# Patient Record
Sex: MALE | Race: BLACK OR AFRICAN AMERICAN | NOT HISPANIC OR LATINO | Employment: UNEMPLOYED | URBAN - METROPOLITAN AREA
[De-identification: names, ages, dates, MRNs, and addresses within clinical notes are randomized per-mention and may not be internally consistent; named-entity substitution may affect disease eponyms.]

---

## 2017-04-03 ENCOUNTER — TELEPHONE (OUTPATIENT)
Dept: NEUROLOGY | Facility: CLINIC | Age: 27
End: 2017-04-03

## 2017-04-18 ENCOUNTER — OFFICE VISIT (OUTPATIENT)
Dept: NEUROLOGY | Facility: CLINIC | Age: 27
End: 2017-04-18
Payer: COMMERCIAL

## 2017-04-18 VITALS
HEIGHT: 67 IN | HEART RATE: 57 BPM | WEIGHT: 206.81 LBS | DIASTOLIC BLOOD PRESSURE: 71 MMHG | BODY MASS INDEX: 32.46 KG/M2 | SYSTOLIC BLOOD PRESSURE: 113 MMHG

## 2017-04-18 DIAGNOSIS — G40.009 PARTIAL IDIOPATHIC EPILEPSY WITH SEIZURES OF LOCALIZED ONSET, NOT INTRACTABLE, WITHOUT STATUS EPILEPTICUS: ICD-10-CM

## 2017-04-18 PROBLEM — G40.409 TONIC CLONIC SEIZURES: Status: ACTIVE | Noted: 2017-04-18

## 2017-04-18 PROCEDURE — 99215 OFFICE O/P EST HI 40 MIN: CPT | Mod: S$GLB,,, | Performed by: PSYCHIATRY & NEUROLOGY

## 2017-04-18 PROCEDURE — 99999 PR PBB SHADOW E&M-EST. PATIENT-LVL III: CPT | Mod: PBBFAC,,, | Performed by: PSYCHIATRY & NEUROLOGY

## 2017-04-18 RX ORDER — LACOSAMIDE 50 MG/1
TABLET ORAL
Qty: 60 TABLET | Refills: 11 | Status: ON HOLD | OUTPATIENT
Start: 2017-04-18 | End: 2017-05-14 | Stop reason: HOSPADM

## 2017-04-18 NOTE — PROGRESS NOTES
"EPILEPSY CLINIC:   FOLLOW UP VISIT    Name: Rigo Baez  MRN:[id number]   CSN: [id number]  Date of service: 4/18/2017    Age:27 y.o.   Gender:male     The patient is here today for follow up to 10/2016 visit for epilepsy visit. Since his last visit he has had six seizures, he had one the day after starting his Vimpat, another after increasing from 50 mg qd to BID.  He has had 4 more since then that all occurred on the weekend and they all occur at night.  Patient denies ever having a seizure while awake, his SO states these seizures occur within 1-2 hours after going to sleep.  He also states that every morning he wakes up his left leg is sore, difficult to use for 2-3 min.  During seizures, they start with a screech, has tonic-clonic movements, and tongue biting w/o loss of bowel or bladder continence.  His wife states he goes back into "deep sleep" after these seizures and is drowsy for the rest of the day, but reports regaining consciousness 2-3 hours later.  He has associated diffuse myalgia & bitemporal, tension headache.    History obtained from significant other and the patient.     CHIEF COMPLAINT: Consult       SEIZURE HISTORY:  This patient has a history of the following seizure types:  tonic/clonic    There is h/o SE in the past: no      Other relevant seizure hx: As above    INTERVAL HISTORY (Since last visit):      This is a 27 y.o.  year old right handed male who presents with a chief complaint of   Chief Complaint   Patient presents with    Consult    who was last seen by me on 10/17/2016          He has been on Lacosamide 100 mg BID for the last 6 months. Reports compliance:  yes     There are no complaints of side effects related to current AED lacosamide  The side-effects are: none    The last seizure was on 4/10  .  The typical seizure frequency has been 1 seizure qmonth. The seizure frequency has been  are worsening since last visit.     Status epilepticus since last visit:  no    Any injury " sustained from seizures since last visit:  yes  If yes, details: Tongue biting w/ sunsequent difficulty w/ po intake for the following week.    Admission to hospital since last visit: no     Overall sense of well being since last visit: are worsening     Any other relevant history since last visit: None    RELEVANT LABS AND TESTS SINCE LAST VISIT:   No results found for this or any previous visit.   No results found for this or any previous visit.     Additional tests:  1)CT Scan: None   2) EEG\Video Monitoring:  None   3) PET Scan:  None  4) Neuropsychological evaluation:  None  5) DEXA Scan:  None   6) Others:  None    RISK FACTORS FOR SEIZURES:    1. Head Trauma:  No    2. CNS Infections:  No  3. CNS Tumors: No     4. CNS Vascular Disease: No     5. Febrile Seizures: No    6. Developmental Delay: No       7. Family History of Seizures: No    8. Birth history: No known complications    CURRENT MEDICATIONS:   No current outpatient prescriptions on file.     No current facility-administered medications for this visit.         CURRENT ANTI EPILEPTIC MEDICATIONS:  Anticonvulsants have been prescribed but not taken including lamotrigine (Lamictal).      PRIOR ANTICONVULSANT HISTORY:   1) Acetazolamide (Diamox, AZM): medication not used  2) Carbamazepine (Tegretol, CBZ): medication not used  3) Ethosuximide (Zarontin, ESM): medication not used  4) Gabapentin(Neurontin, GPN): medication not used  5) Eslicarbazepine:  medication not used  6) Lacosamide (Vimpat, LCS): Lacosamide used but not effective  7) Lamotrigine (Lamictal, LTG): medication not used  8) Levatiracetam (Keppra, LEV): medication not used  9) Methosuximide (Celontin, MSM): medication not used  10) Methylphenytoin (Mesantoin, MHT):medication not used  11) Oxcarbazepine (Trileptal, OXC): medication not used  12) Phenobarbital (PB): medication not used  13) Phenytoin (Dilantin, PHT):  medication not used  14) Pregabalin (Lyrica, PGB): medication not used  15)  Primidone (Mysoline, PRM): medication not used  16) Retigabine (Potega, RTG): medication not used  17) Rufinamide (Banzel, RUF): medication not used  18) Tiagabine (Gabatril, TGB): medication not used  19) Topiramate (Topamax, TPM):  medication not used  20) Vigabatrin (Sabril, VGB): medication not used  21) Valproic acid (Depakote, VPA): medication not used  22) Zonisamide (Zonegran, ZNA): medication not used    Benzodiazepines:  1) Diazepam: Rectal (Diastat): medication not used  2) Diazepam: Oral (Valium, DZ): medication not used  3) Clorazepate (Tranxene, CLZ):  medication not used  4) Clobazem (Omfi, Frisium, CLB): medication not used    Vagal Nerve Stimulator: medication not used      PAST MEDICAL HISTORY:   Active Ambulatory Problems     Diagnosis Date Noted    No Active Ambulatory Problems     Resolved Ambulatory Problems     Diagnosis Date Noted    No Resolved Ambulatory Problems     No Additional Past Medical History        PAST PSYCHIATRIC HISTORY:  Anxiety associated with sleep 2/2 to concern for seizure while sleeping    PAST SURGICAL HISTORY including Epilepsy surgery: No past surgical history on file.     FAMILY HISTORY: No family history on file.      SOCIAL HISTORY:   Social History     Social History    Marital status: Single     Spouse name: N/A    Number of children: N/A    Years of education: N/A     Occupational History    Not on file.     Social History Main Topics    Smoking status: Never Smoker    Smokeless tobacco: Not on file    Alcohol use Not on file    Drug use: Not on file    Sexual activity: Not on file     Other Topics Concern    Not on file     Social History Narrative      a) Marital status: Lives with SO of 5 years                                                    b) Living situation: patient lives with significant other  c) Employed/Unemployed/Other: Employed full time    DRIVING HISTORY:  Currently driving: Yes      LEVEL OF EDUCATION: some college - 3y of  "college    SUBSTANCE USE:No tob/EtOH/IVDU    ALLERGIES: Review of patient's allergies indicates no known allergies.     REVIEW OF SYSTEMS:  Review of Systems   Constitutional: Negative for chills and fever.   HENT: Negative for ear pain, hearing loss, mouth sores and sore throat.    Eyes: Negative for photophobia, pain and visual disturbance.   Respiratory: Negative for chest tightness and shortness of breath.    Cardiovascular: Negative for chest pain and palpitations.   Gastrointestinal: Negative for abdominal distention, abdominal pain, anal bleeding, blood in stool, constipation, diarrhea, nausea, rectal pain and vomiting.   Endocrine: Negative for polydipsia and polyuria.   Genitourinary: Negative for dysuria and hematuria.   Musculoskeletal: Negative for arthralgias and myalgias.   Skin: Negative for rash and wound.   Neurological: Positive for seizures and weakness (RLE on waking). Negative for dizziness, tremors, syncope, speech difficulty, light-headedness, numbness and headaches.        GENERAL EXAMINATION:  /71  Pulse (!) 57  Ht 5' 7" (1.702 m)  Wt 93.8 kg (206 lb 12.7 oz)  BMI 32.39 kg/m2     GENERAL EXAMINATION:  General Appearance:    This is an average built male who appears well.  HEENT: There are no dysmorphic features  Skin: There are no obvious stigmata for neurocutaneous disorders.  Neck: supple   Cardiovascular: regular rate and rhythm  Lungs: clear  Abdomen: deferred  The spine is non-tender.  Kyphosis:  NoScoliosis: No   Extremities: Warm and well perfused    NEUROLOGICAL EXAMINATION:  Mental status: Alert and oriented x 4; pleasant and cooperative with exam  Memory: Recent memory intact, Remote memory intact, Age correct, Month correct  Attention and concentration: intact  Fund of knowledge: adequate  Speech: normal  Dysarthria: No   Eyes: PERRL; EOM intact; No nystagmus.The visual pursuits  were smooth with normal saccadic eye movements. Fundoscopic Exam: normal w/o hemorrhages, " exudates, or papilledema  No facial asymmetry. Intact facial sensation bilaterally.  Hearing was intact bilaterally to finger rub  Tongue and palate was in the midline  Intact SCM and trapezii bilaterally     Motor examination:  Normal bulk and tone bilaterally. Power: no pronater drift; 5/5 bilaterally symmetric UE/LE  Abnormal movements: none  Deep tendon reflexes: 2+ bilaterally symmetric UE/LE with flexor plantars  Dysmetria: No     Sensory examination:   Normal, light touch, pin prick, and vibration bilaterally symmetric UE/LE    Gait:  Normal gait and station; able to tandem walk without any difficulty      IMPRESSION:  The patient's history is suggestive of interval history of worsening seizures.  Will need to prusue the imaging ordered during previous visit to further characterize the cause of his seizures.     Related Condition(s): None    PLAN:  Partial idiopathic epilepsy with seizures of localized onset, not intractable, without status epilepticus       - EMU admission 5/8/2017       - MRI Brain W WO contrast - seizure protocol before EMU admission       - Continue current Rx w/ Lacosamide 100 mg BID - taper off for 2 days prior to  EMU admission       - Refill for Lacosamide printed off this visit           2014 EPILEPSY QUALITY MEASUREMENT (AAN)    1a. Seizure Frequency: 1/mo  1b. Seizure Intervention:Lacosamide 100 mg BID    2.  a. Etiology: Unclear - will require further investigation  b. Seizure Type:  Pending further evaluation  c. Epilepsy Syndrome: Pending further evaluation    3.  a. Side-effects of AED: None reported   b. Intervention for side-effects: N/A    4. Screening for psychiatric or behavioral disorders: Anxiety    5. 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 Counseling for women of child-bearing potential and epilepsy: N/A    7. Referral of treatment-resistant epilepsy to  [facility] epilepsy center (q 2 years): N/A.    The patient was asked to call me/the clinic 1 week after the test(s) are done to obtain results.    The following issues were  all discussed in detail with the patient and family/caregiver(s):    1. Diagnosis, plans, prognosis, medications and possible side-effects, risks and benefits of treatment, other alternatives to AEDs.  2. Risks related to continued seizures, status epilepticus, SUDEP, driving restrictions and seizure precautions ( no baths but showers are ok, no swimming unsupervised, no use of heavy machinery, no use of sharp moving objects, avoid heights).   3. Issues related to pregnancy, OCP and breast feeding as it relates to epilepsy.  4. The potential of teratogenicity and suicidal risks of anti-epileptic medications.  5.Avoid any activity that compromise patient safety related to seizures.     Questions and concerns raised by the patient and family/care-giver(s) were addressed and they indicated understanding of everything discussed and agreed to plans as above.    5/8/2016 EMU admission    Andrew Cummins MD  PGY - 1  Pager: [phone]

## 2017-04-19 DIAGNOSIS — R56.9 SEIZURES: Primary | ICD-10-CM

## 2017-04-28 ENCOUNTER — PATIENT MESSAGE (OUTPATIENT)
Dept: NEUROLOGY | Facility: CLINIC | Age: 27
End: 2017-04-28

## 2017-05-04 ENCOUNTER — TELEPHONE (OUTPATIENT)
Dept: NEUROLOGY | Facility: CLINIC | Age: 27
End: 2017-05-04

## 2017-05-04 NOTE — TELEPHONE ENCOUNTER
Spoke to Tanya, pts partner, who stated pt cannot commit to EMU admission 5/8 due to his employment. Advised Tanya, I can also write letter advising need for admission if necessary. Tanya has my direct # and will call back when rescheduling possible.

## 2017-05-08 ENCOUNTER — HOSPITAL ENCOUNTER (INPATIENT)
Facility: HOSPITAL | Age: 27
LOS: 6 days | Discharge: HOME | DRG: 101 | End: 2017-05-14
Attending: INTERNAL MEDICINE | Admitting: INTERNAL MEDICINE
Payer: COMMERCIAL

## 2017-05-08 ENCOUNTER — HOSPITAL ENCOUNTER (OUTPATIENT)
Dept: RADIOLOGY | Facility: HOSPITAL | Age: 27
Discharge: HOME | DRG: 101 | End: 2017-05-08
Attending: PSYCHIATRY & NEUROLOGY
Payer: COMMERCIAL

## 2017-05-08 DIAGNOSIS — G40.219 LOCALIZATION-RELATED (FOCAL) (PARTIAL) SYMPTOMATIC EPILEPSY AND EPILEPTIC SYNDROMES WITH COMPLEX PARTIAL SEIZURES, INTRACTABLE, WITHOUT STATUS EPILEPTICUS: ICD-10-CM

## 2017-05-08 DIAGNOSIS — R56.9 SEIZURES: ICD-10-CM

## 2017-05-08 LAB
ALBUMIN SERPL BCP-MCNC: 3.6 G/DL
ALP SERPL-CCNC: 142 U/L
ALT SERPL W/O P-5'-P-CCNC: 22 U/L
AMPHET+METHAMPHET UR QL: NEGATIVE
ANION GAP SERPL CALC-SCNC: 9 MMOL/L
AST SERPL-CCNC: 25 U/L
BARBITURATES UR QL SCN>200 NG/ML: NEGATIVE
BASOPHILS # BLD AUTO: 0.03 K/UL
BASOPHILS NFR BLD: 0.3 %
BENZODIAZ UR QL SCN>200 NG/ML: NEGATIVE
BILIRUB SERPL-MCNC: 0.4 MG/DL
BILIRUB UR QL STRIP: NEGATIVE
BUN SERPL-MCNC: 10 MG/DL
BZE UR QL SCN: NEGATIVE
CALCIUM SERPL-MCNC: 9.1 MG/DL
CANNABINOIDS UR QL SCN: NEGATIVE
CHLORIDE SERPL-SCNC: 106 MMOL/L
CLARITY UR REFRACT.AUTO: ABNORMAL
CO2 SERPL-SCNC: 24 MMOL/L
COLOR UR AUTO: YELLOW
CREAT SERPL-MCNC: 1 MG/DL
CREAT UR-MCNC: 123 MG/DL
DIFFERENTIAL METHOD: NORMAL
EOSINOPHIL # BLD AUTO: 0.5 K/UL
EOSINOPHIL NFR BLD: 4.8 %
ERYTHROCYTE [DISTWIDTH] IN BLOOD BY AUTOMATED COUNT: 13 %
EST. GFR  (AFRICAN AMERICAN): >60 ML/MIN/1.73 M^2
EST. GFR  (NON AFRICAN AMERICAN): >60 ML/MIN/1.73 M^2
GLUCOSE SERPL-MCNC: 75 MG/DL
GLUCOSE UR QL STRIP: NEGATIVE
HCT VFR BLD AUTO: 45.1 %
HGB BLD-MCNC: 15.6 G/DL
HGB UR QL STRIP: NEGATIVE
KETONES UR QL STRIP: NEGATIVE
LEUKOCYTE ESTERASE UR QL STRIP: NEGATIVE
LYMPHOCYTES # BLD AUTO: 3.4 K/UL
LYMPHOCYTES NFR BLD: 30.5 %
MAGNESIUM SERPL-MCNC: 2.2 MG/DL
MCH RBC QN AUTO: 29.4 PG
MCHC RBC AUTO-ENTMCNC: 34.6 %
MCV RBC AUTO: 85 FL
METHADONE UR QL SCN>300 NG/ML: NEGATIVE
MONOCYTES # BLD AUTO: 1 K/UL
MONOCYTES NFR BLD: 9.2 %
NEUTROPHILS # BLD AUTO: 6.1 K/UL
NEUTROPHILS NFR BLD: 55 %
NITRITE UR QL STRIP: NEGATIVE
OPIATES UR QL SCN: NEGATIVE
PCP UR QL SCN>25 NG/ML: NEGATIVE
PH UR STRIP: 7 [PH] (ref 5–8)
PHOSPHATE SERPL-MCNC: 3 MG/DL
PLATELET # BLD AUTO: 248 K/UL
PMV BLD AUTO: 9.6 FL
POTASSIUM SERPL-SCNC: 4.1 MMOL/L
PROT SERPL-MCNC: 7.6 G/DL
PROT UR QL STRIP: NEGATIVE
RBC # BLD AUTO: 5.31 M/UL
SODIUM SERPL-SCNC: 139 MMOL/L
SP GR UR STRIP: 1.02 (ref 1–1.03)
T4 FREE SERPL-MCNC: 1.04 NG/DL
TOXICOLOGY INFORMATION: NORMAL
TSH SERPL DL<=0.005 MIU/L-ACNC: 0.18 UIU/ML
URN SPEC COLLECT METH UR: ABNORMAL
UROBILINOGEN UR STRIP-ACNC: NEGATIVE EU/DL
WBC # BLD AUTO: 11.14 K/UL

## 2017-05-08 PROCEDURE — 25000003 PHARM REV CODE 250: Performed by: PHYSICIAN ASSISTANT

## 2017-05-08 PROCEDURE — 70553 MRI BRAIN STEM W/O & W/DYE: CPT | Mod: TC

## 2017-05-08 PROCEDURE — 99222 1ST HOSP IP/OBS MODERATE 55: CPT | Mod: ,,, | Performed by: PHYSICIAN ASSISTANT

## 2017-05-08 PROCEDURE — 80307 DRUG TEST PRSMV CHEM ANLYZR: CPT

## 2017-05-08 PROCEDURE — 80053 COMPREHEN METABOLIC PANEL: CPT

## 2017-05-08 PROCEDURE — 25500020 PHARM REV CODE 255: Performed by: PSYCHIATRY & NEUROLOGY

## 2017-05-08 PROCEDURE — 83735 ASSAY OF MAGNESIUM: CPT

## 2017-05-08 PROCEDURE — 95951 PR EEG MONITORING/VIDEORECORD: CPT | Mod: 26,,, | Performed by: PSYCHIATRY & NEUROLOGY

## 2017-05-08 PROCEDURE — 82570 ASSAY OF URINE CREATININE: CPT

## 2017-05-08 PROCEDURE — 85025 COMPLETE CBC W/AUTO DIFF WBC: CPT

## 2017-05-08 PROCEDURE — 36415 COLL VENOUS BLD VENIPUNCTURE: CPT

## 2017-05-08 PROCEDURE — 84439 ASSAY OF FREE THYROXINE: CPT

## 2017-05-08 PROCEDURE — 84100 ASSAY OF PHOSPHORUS: CPT

## 2017-05-08 PROCEDURE — 93010 ELECTROCARDIOGRAM REPORT: CPT | Mod: ,,, | Performed by: INTERNAL MEDICINE

## 2017-05-08 PROCEDURE — A9585 GADOBUTROL INJECTION: HCPCS | Performed by: PSYCHIATRY & NEUROLOGY

## 2017-05-08 PROCEDURE — 70553 MRI BRAIN STEM W/O & W/DYE: CPT | Mod: 26,,, | Performed by: RADIOLOGY

## 2017-05-08 PROCEDURE — 20600001 HC STEP DOWN PRIVATE ROOM

## 2017-05-08 PROCEDURE — 93005 ELECTROCARDIOGRAM TRACING: CPT

## 2017-05-08 PROCEDURE — 84443 ASSAY THYROID STIM HORMONE: CPT

## 2017-05-08 PROCEDURE — 81003 URINALYSIS AUTO W/O SCOPE: CPT

## 2017-05-08 PROCEDURE — 95957 EEG DIGITAL ANALYSIS: CPT

## 2017-05-08 PROCEDURE — 95951 HC EEG MONITORING/VIDEO RECORD: CPT

## 2017-05-08 RX ORDER — ACETAMINOPHEN 325 MG/1
650 TABLET ORAL EVERY 4 HOURS PRN
Status: DISCONTINUED | OUTPATIENT
Start: 2017-05-08 | End: 2017-05-14 | Stop reason: HOSPADM

## 2017-05-08 RX ORDER — SODIUM CHLORIDE 0.9 % (FLUSH) 0.9 %
3 SYRINGE (ML) INJECTION EVERY 8 HOURS
Status: DISCONTINUED | OUTPATIENT
Start: 2017-05-08 | End: 2017-05-14 | Stop reason: HOSPADM

## 2017-05-08 RX ORDER — DOCUSATE SODIUM 100 MG/1
100 CAPSULE, LIQUID FILLED ORAL 2 TIMES DAILY
Status: DISCONTINUED | OUTPATIENT
Start: 2017-05-08 | End: 2017-05-14 | Stop reason: HOSPADM

## 2017-05-08 RX ORDER — ONDANSETRON 8 MG/1
8 TABLET, ORALLY DISINTEGRATING ORAL EVERY 8 HOURS PRN
Status: DISCONTINUED | OUTPATIENT
Start: 2017-05-08 | End: 2017-05-14 | Stop reason: HOSPADM

## 2017-05-08 RX ORDER — ONDANSETRON 2 MG/ML
4 INJECTION INTRAMUSCULAR; INTRAVENOUS EVERY 8 HOURS PRN
Status: DISCONTINUED | OUTPATIENT
Start: 2017-05-08 | End: 2017-05-14 | Stop reason: HOSPADM

## 2017-05-08 RX ORDER — GADOBUTROL 604.72 MG/ML
10 INJECTION INTRAVENOUS
Status: COMPLETED | OUTPATIENT
Start: 2017-05-08 | End: 2017-05-08

## 2017-05-08 RX ADMIN — GADOBUTROL 10 ML: 604.72 INJECTION INTRAVENOUS at 07:05

## 2017-05-08 RX ADMIN — SODIUM CHLORIDE, PRESERVATIVE FREE 3 ML: 5 INJECTION INTRAVENOUS at 09:05

## 2017-05-08 NOTE — ASSESSMENT & PLAN NOTE
- Continuous EEG monitoring. EMU consulted  - Keppra held on admit per EMU recs  - Seizure precautions and activations per protcol  - IV Valium PRN for GTC greater than 5 min - hospital medicine to call epilepsy on call before administering

## 2017-05-08 NOTE — CONSULTS
[facility]  Epilepsy Consult Note  Name: Rigo Baez  MRN: [id number]   CSN: [id number]      Date: 05/08/2017    SUBJECTIVE:   Patient seen in consultation by Tanya Fisher PA-C for seizures.     History of Present Illness:  The patient is a 27 y.o. yo RHAAM   The patient was accompanied by his family member who provided some of the history.      Patient reports that he started having seizures at age 22. He reports that they occur out of sleep. He states that his girlfriend has told him that she makes a screeching sound followed by tonic clonic activity, reports tongue biting. He reports that his last seizure was yesterday. He reports that frequency varies form 1 a month to 2-3 a month. He reports that he stopped taking vimpat 3 weeks ago. He is on no other AEDs. Please see below clinic note for more details.       Epilepsy History (per Dr. Tate's clinic note)  Onset of seizures was at age 22 years - all seizures to date per patient and GF are nocturnal.     1st seizure: per patient he woke up one day in the ambulance - has no recollection of the event.   GF states that she heard a screech (typical - occurs with all subsequent sz) followed by generalized tonic, clonic activity with his eyes wide open and frothing at the mouth and unresponsive. EMS was called and he was taklen to [facility] - evaluated and sent home. Not started on AED  2nd seizure occurred ~ 5-6 months later.  Subsequently occurred q 4-6 months until x 2 years - now occurring q 2-3 per month.   Patient reports that he always bites his tongue and his muscles feel sore the next day after the seizure - that is how he know he had a seizure.     Last seizure: 09/16  Triggers for seizures: sleep deprivation - possibly  The longest seizure-free interval: 6 months until 2 years ago, then 1 month only  There is no history of status epilepticus.   There is history of physical injury as a result of seizures: bruises  Postictal  symptoms include: muscle soreness and tiredness    Seizure Triggers  Sleep Deprivation - None  Other medications - None  Psych/stress - None  Photic stimulation - None  Hyperventilation - None  Medical Problems - None  Menses - No  Sensory Stimulation (light, sound, etc) - None  Missed dose of meds - None    AED Treatments  Present regimen  Vimpat     Prior treatments      Not tried  acetazolamide (Diamox, AZM)  amantadine  carbamazepine (Tegretol, CBZ)  clobezam (Onfi or Frizium, CLB)  ethosuximide (Zarontin, ESM)  eslicarbazine (Aptiom, ESL)  felbamate (felbatol, FBM)  gabapentin (Neurontin, GPN)  lamotrigine (Lamictal, LTG)   levetiracetam (Keppra, LEV)  methsuximide (Celontin, MSM)  methyphenytoin (Mesantion, MHT)  oxcarbazepine (Trileptal OXC)  perampanel (Fycompa, FCP)   phenobarbital (Pb)  phenytoin (Dilantin, PHT)  pregabalin (Lyrica, PGB)  primidone (Mysoline, PRM)  retigabine (Potiga, RTG)  rufinamide (Banzel, RUF)  tiagabine (Gabatril,  TGB)  topiramate (Topamax, TPM)  viagabatrin, (Sabril, VGB)  vagal nerve stimulator (VNS)  valproic acid (Depakote, VPA)  zonisamide (Zonegran, ZNA)  Benzodiazepines  diazepam - rectal (Diastatl)  diazepam - oral (Valium, DZ)  clonazepam (Klonopin, CZP)  clorazepate (Tranxene, CLZ)  Ativan  Brain Stimulation  Vagal Nerve Stimulation-n/a  DBS- n/a    Compliance method  Memory - yes  Mom or Spouse - Yes  Pill Box - no  Rudy calendar - no  Turn over medication bottle - no  Phone alarm - no    Seizure Evaluation  EEG Routine -   EEG Ambulatory -   EEG\Video Monitoring -   MRI/MRA -   5/8/17  9 mm somewhat rounded region of susceptibility in the right putamen concerning for possible prior hemorrhage however no volume loss or signal abnormality on additional sequences in this region. Clinical correlation and correlation with prior EEG findings advised.    Otherwise unremarkable MRI brain specifically without evidence for additional parenchymal signal abnormality or enhancing  lesion.    Incidental probable proteinaceous mucus retention cyst right maxillary antra.    CT/CTA Scan -   PET Scan -   Neuropsychological evaluation -   DEXA Scan    Potential Epilepsy Risk Factors:   Pregnancy/Labor/Delivery - full term uncomplicated pregnancy labor and vaginal delivery  Febrile seizures - none  Head injury  - none  CNS infection - none     Stroke - none  Family Hx of Sz - none    Review of Systems:  Constitutional: no fever or chills  Eyes: no visual changes  ENT: no nasal congestion or sore throat  Respiratory: no cough or shortness of breath  Cardiovascular: no chest pain or palpitations  Gastrointestinal: no nausea or vomiting, no abdominal pain or change in bowel habits  Genitourinary: no hematuria or dysuria  Hematologic/Lymphatic: no easy bruising or lymphadenopathy  Musculoskeletal: no arthralgias or myalgias  Neurological: positive for seizures, negative for dizziness, headaches, tremors and weakness  Behavioral/Psych: no auditory or visual hallucinations  Endocrine: no heat or cold intolerance    PAST MEDICAL HISTORY:   Active Ambulatory Problems     Diagnosis Date Noted    No Active Ambulatory Problems     Resolved Ambulatory Problems     Diagnosis Date Noted    No Resolved Ambulatory Problems     No Additional Past Medical History        PAST SURGICAL HISTORY: No past surgical history on file.     FAMILY HISTORY: No family history on file.      SOCIAL HISTORY:   Social History     Social History    Marital status: Single     Spouse name: N/A    Number of children: N/A    Years of education: N/A     Occupational History    Not on file.     Social History Main Topics    Smoking status: Never Smoker    Smokeless tobacco: Not on file    Alcohol use Not on file    Drug use: Not on file    Sexual activity: Not on file     Other Topics Concern    Not on file     Social History Narrative        SUBSTANCE USE:  Social History     Social History Main Topics    Smoking status: Never  Smoker    Smokeless tobacco: Not on file    Alcohol use Not on file    Drug use: Not on file    Sexual activity: Not on file      Social History   Substance Use Topics    Smoking status: Never Smoker    Smokeless tobacco: Not on file    Alcohol use Not on file        ALLERGIES: Review of patient's allergies indicates no known allergies.       OBJECTIVE:     Vital Signs (Most Recent):       Physical Exam:  General: well developed, well nourished  HENT: Head:normocephalic, atraumatic. Ears:right ear normal, left ear normal. Nose: no discharge. Throat: lips, mucosa, and tongue normal; teeth and gums normal.  Eyes: conjunctivae/corneas clear. PERRL.   Lungs:  normal respiratory effort  Cardiovascular: Heart: regular rate and rhythm. Chest Wall: no tenderness. Extremities: no cyanosis or edema, or clubbing. Pulses: not examined.    Higher Cortical Function:    Patient is a well developed, pleasant, well groomed individual appearing their stated age  Oriented - intact to person, place and time and followed two step instruction correctly.   Memory - Intact   Fund of knowledge was appropriate.    R-L Orientation - Intact   Language - Speech was fluent without evidence for an aphasia.    Cranial Nerves II - XII:    EOMs were intact with normal smooth and no nystagmus.    PERRLA.  Visual fields were full to confrontation.    Motor - facial movement was symmetrical and normal.    Facial sensory - Light touch sensations were normal.    Hearing was normal to finger rub.  Palate moved well and was symmetrical with normal palatal and oral sensation.    Tongue movement was full. Normal power and bulk was found in the massiter and rotator muscles of the neck.  Motor: Power, bulk and tone were normal in all extremities.  Sensory: Light touch and position senses were normal in all extremities.    Coordination:       Rapid alternating movements and rapid finger tapping - normal.       Finger to nose - nl.    Gait:  Station, gait  and tandem walking were done without difficulty and Romberg was negative.  Tremor: resting, postural, intentional - none    Laboratory:  CBC: No results for input(s): WBC, RBC, HGB, HCT, PLT, MCV, MCH, MCHC in the last 168 hours.  CMP: No results for input(s): GLU, CALCIUM, ALBUMIN, PROT, NA, K, CO2, CL, BUN, CREATININE, ALKPHOS, ALT, AST, BILITOT in the last 168 hours.    Diagnostic Results:  Labs: Reviewed  MRI: Reviewed    ASSESSMENT/PLAN:     IMPRESSION:  1. Seizures - focal with secondary generalization vs primary generalized epilepsy; EMU for definitive diagnosis and to assess any unidentified daytime seizures    DISPOSITION:    1. Start VEEG  2. Hold Vimpat   3. Seizure precautions  4. Activation procedures per protocol - none currently   5. IV Valium PRN for GTC greater than 5 min - hospital medicine to call epilepsy on call before administering      Discussed with Dr. Tate

## 2017-05-08 NOTE — H&P
"[facility] Hospital Medicine  History & Physical    Patient Name: Rigo Baez  MRN: [id number]  Admission Date: 5/8/2017  Attending Physician: Aracelis Jorge MD   Primary Care Provider: Primary Doctor [facility] Medicine Team: [facility] HOSP MED F Tanya Fisher PA-C     Patient information was obtained from patient and ER records.     Subjective:     Principal Problem:Localization-related (focal) (partial) symptomatic epilepsy and epileptic syndromes with complex partial seizures, intractable, without status epilepticus    Chief Complaint: No chief complaint on file.       HPI: 27 year old male with PMHx of seizure disorder presenting for EEG monitoring to characterize convulsions. Pt denies any other past medical history as well as tobacco use, alcohol abuse, and illicit drug use. Pt reports episodes began at age 22 and have always occurred during sleep. Pt does not recall the events. Pt endorses post-ictal confusion, fatigue following episodes, and tongue biting. Pt denies urinary and bowel incontinence. Pt reports he has not taken his keppra in over a month because "it didn't work." Pt reports last seizure occurred last night during sleep. Today, pt c/o myalgias and tongue soreness 2/2 event last night. Pt denies chest pain, SOB, abdominal pain, N/V/D, fever, chills, and recent sick contacts.    No past medical history on file.    No past surgical history on file.    Review of patient's allergies indicates:  No Known Allergies    No current facility-administered medications on file prior to encounter.      Current Outpatient Prescriptions on File Prior to Encounter   Medication Sig    lacosamide (VIMPAT) 50 mg Tab Take 2 tablets twice daily     Family History     None        Social History Main Topics    Smoking status: Never Smoker    Smokeless tobacco: Not on file    Alcohol use Not on file    Drug use: Not on file    Sexual activity: Not on file     Review of Systems   Constitutional: " Negative for chills, diaphoresis, fatigue and fever.   HENT: Negative for congestion, hearing loss, rhinorrhea, sinus pressure and trouble swallowing.    Eyes: Negative for photophobia and visual disturbance.   Respiratory: Negative for cough, chest tightness, shortness of breath and wheezing.    Cardiovascular: Negative for chest pain, palpitations and leg swelling.   Gastrointestinal: Negative for abdominal distention, abdominal pain, diarrhea, nausea and vomiting.   Endocrine: Negative for cold intolerance and heat intolerance.   Genitourinary: Negative for dysuria, frequency and hematuria.   Musculoskeletal: Positive for myalgias. Negative for back pain, gait problem, neck pain and neck stiffness.   Skin: Negative for rash and wound.   Allergic/Immunologic: Negative for immunocompromised state.   Neurological: Positive for seizures. Negative for dizziness, syncope, weakness, light-headedness and headaches.   Hematological: Negative for adenopathy. Does not bruise/bleed easily.   Psychiatric/Behavioral: Negative for agitation, behavioral problems, confusion and dysphoric mood. The patient is not nervous/anxious.      Objective:     Vital Signs (Most Recent):    Vital Signs (24h Range):           There is no height or weight on file to calculate BMI.    Physical Exam   Constitutional: He is oriented to person, place, and time. He appears well-developed and well-nourished. No distress.   HENT:   Head: Normocephalic and atraumatic.   Mouth/Throat: No oropharyngeal exudate.   Eyes: Conjunctivae and EOM are normal. No scleral icterus.   Neck: Normal range of motion. Neck supple. No JVD present. No tracheal deviation present.   Cardiovascular: Normal rate, regular rhythm, normal heart sounds and intact distal pulses.    No murmur heard.  Abdominal: Soft. Bowel sounds are normal. He exhibits no distension. There is no tenderness.   Musculoskeletal: Normal range of motion. He exhibits no edema.   Neurological: He is  alert and oriented to person, place, and time. No cranial nerve deficit.   Skin: Skin is warm and dry. He is not diaphoretic. No erythema.   Psychiatric: He has a normal mood and affect. His behavior is normal.        Significant Labs: All pertinent labs within the past 24 hours have been reviewed.    Significant Imaging: I have reviewed all pertinent imaging results/findings within the past 24 hours.    Assessment/Plan:     * Localization-related (focal) (partial) symptomatic epilepsy and epileptic syndromes with complex partial seizures, intractable, without status epilepticus  - Continuous EEG monitoring. EMU consulted  - Keppra held on admit per EMU recs  - Seizure precautions and activations per protcol  - IV Valium PRN for GTC greater than 5 min - hospital medicine to call epilepsy on call before administering       VTE Risk Mitigation         Ordered     Low Risk of VTE  Once      05/08/17 1051        Tanya Fisher PA-C  Department of Hospital Medicine   [facility]

## 2017-05-08 NOTE — SUBJECTIVE & OBJECTIVE
No past medical history on file.    No past surgical history on file.    Review of patient's allergies indicates:  No Known Allergies    No current facility-administered medications on file prior to encounter.      Current Outpatient Prescriptions on File Prior to Encounter   Medication Sig    lacosamide (VIMPAT) 50 mg Tab Take 2 tablets twice daily     Family History     None        Social History Main Topics    Smoking status: Never Smoker    Smokeless tobacco: Not on file    Alcohol use Not on file    Drug use: Not on file    Sexual activity: Not on file     Review of Systems   Constitutional: Negative for chills, diaphoresis, fatigue and fever.   HENT: Negative for congestion, hearing loss, rhinorrhea, sinus pressure and trouble swallowing.    Eyes: Negative for photophobia and visual disturbance.   Respiratory: Negative for cough, chest tightness, shortness of breath and wheezing.    Cardiovascular: Negative for chest pain, palpitations and leg swelling.   Gastrointestinal: Negative for abdominal distention, abdominal pain, diarrhea, nausea and vomiting.   Endocrine: Negative for cold intolerance and heat intolerance.   Genitourinary: Negative for dysuria, frequency and hematuria.   Musculoskeletal: Positive for myalgias. Negative for back pain, gait problem, neck pain and neck stiffness.   Skin: Negative for rash and wound.   Allergic/Immunologic: Negative for immunocompromised state.   Neurological: Positive for seizures. Negative for dizziness, syncope, weakness, light-headedness and headaches.   Hematological: Negative for adenopathy. Does not bruise/bleed easily.   Psychiatric/Behavioral: Negative for agitation, behavioral problems, confusion and dysphoric mood. The patient is not nervous/anxious.      Objective:     Vital Signs (Most Recent):    Vital Signs (24h Range):           There is no height or weight on file to calculate BMI.    Physical Exam   Constitutional: He is oriented to person,  place, and time. He appears well-developed and well-nourished. No distress.   HENT:   Head: Normocephalic and atraumatic.   Mouth/Throat: No oropharyngeal exudate.   Eyes: Conjunctivae and EOM are normal. No scleral icterus.   Neck: Normal range of motion. Neck supple. No JVD present. No tracheal deviation present.   Cardiovascular: Normal rate, regular rhythm, normal heart sounds and intact distal pulses.    No murmur heard.  Abdominal: Soft. Bowel sounds are normal. He exhibits no distension. There is no tenderness.   Musculoskeletal: Normal range of motion. He exhibits no edema.   Neurological: He is alert and oriented to person, place, and time. No cranial nerve deficit.   Skin: Skin is warm and dry. He is not diaphoretic. No erythema.   Psychiatric: He has a normal mood and affect. His behavior is normal.        Significant Labs: All pertinent labs within the past 24 hours have been reviewed.    Significant Imaging: I have reviewed all pertinent imaging results/findings within the past 24 hours.

## 2017-05-08 NOTE — PLAN OF CARE
"Problem: Patient Care Overview  Goal: Plan of Care Review  POC reviewed w/ pt. Telemetry on pt, nsr. eeg on pt. Aaox4. Tolerating diet well, denies n/v. No s/x of distress noted. Pt states "i had a seizure last night at home, i hope i have one here". Side rails padded, suction at bs. Will cont to monitor.       "

## 2017-05-09 PROCEDURE — 95951 HC EEG MONITORING/VIDEO RECORD: CPT

## 2017-05-09 PROCEDURE — 25000003 PHARM REV CODE 250: Performed by: PHYSICIAN ASSISTANT

## 2017-05-09 PROCEDURE — 95957 EEG DIGITAL ANALYSIS: CPT

## 2017-05-09 PROCEDURE — 99232 SBSQ HOSP IP/OBS MODERATE 35: CPT | Mod: ,,, | Performed by: PHYSICIAN ASSISTANT

## 2017-05-09 PROCEDURE — 20600001 HC STEP DOWN PRIVATE ROOM

## 2017-05-09 RX ORDER — DIPHENHYDRAMINE HCL 50 MG
50 CAPSULE ORAL ONCE
Status: COMPLETED | OUTPATIENT
Start: 2017-05-10 | End: 2017-05-10

## 2017-05-09 RX ORDER — TRAMADOL HYDROCHLORIDE 50 MG/1
100 TABLET ORAL ONCE
Status: COMPLETED | OUTPATIENT
Start: 2017-05-10 | End: 2017-05-10

## 2017-05-09 RX ADMIN — SODIUM CHLORIDE, PRESERVATIVE FREE 3 ML: 5 INJECTION INTRAVENOUS at 05:05

## 2017-05-09 RX ADMIN — SODIUM CHLORIDE, PRESERVATIVE FREE 3 ML: 5 INJECTION INTRAVENOUS at 09:05

## 2017-05-09 NOTE — ASSESSMENT & PLAN NOTE
- Continuous EEG monitoring. EMU consulted  - Keppra held on admit per EMU recs  - Seizure precautions and activations per protcol  5/9: No seizures since admit. Sleep deprivation tonight, benadryl 50 mg and tramadol 100 gm tomorrow morning per EMU.  - IV Valium PRN for GTC greater than 5 min - hospital medicine to call epilepsy on call before administering

## 2017-05-09 NOTE — PLAN OF CARE
05/09/17 1438   Discharge Assessment   Assessment Type Discharge Planning Assessment   Confirmed/corrected address and phone number on facesheet? Yes   Assessment information obtained from? Patient   Expected Length of Stay (days) 5   Communicated expected length of stay with patient/caregiver yes   Prior to hospitilization cognitive status: Alert/Oriented   Prior to hospitalization functional status: Independent   Current cognitive status: Alert/Oriented   Current Functional Status: Independent   Arrived From home or self-care   Able to Return to Prior Arrangements yes   Is patient able to care for self after discharge? Yes   Patient's perception of discharge disposition home or selfcare   Readmission Within The Last 30 Days no previous admission in last 30 days   Equipment Currently Used at Home none   Discharge Plan A Home   Discharge Plan B Home   Patient/Family In Agreement With Plan yes

## 2017-05-09 NOTE — PROCEDURES
EPILEPSY MONITORING UNIT  EEG/VIDEO TELEMETRY REPORT    Rigo Baez  [id number]  1990    DATE OF SERVICE: 5/8-9/2017    DATE OF ADMISSION: 5/8/2017  9:34 AM      METHODOLOGY      Electroencephalographic (EEG) is recorded with electrodes placed according to the International 10-20 placement system.  Thirty Two (32) channels of digital signal including the T1 and T2 electrodes are simultaneously recorded from the scalp and also including EKG, EMG  and/or eye movement monitors.  Recording band pass was 0.1 to 512 hz.  Digital video recording of the patient is simultaneously recorded with the EEG.  The patient is instructed report clinical symptoms which may occur during the recording session.  EEG and video recording is stored and archived in digital format. Activation procedures which include photic stimulation, hyperventilation and instructing patients to perform simple task are done in selected patients.         The EEG is displayed on a monitor screen and can be reformatted into different montages for evaluation.  The entire recoding is submitted for computer assisted analysis to detect spike and electrographic seizure activity.  The entire recording is visually reviewed and the times identified by computer analysis as being spikes or seizures are reviewed again.  Compresses spectral analysis (CSA) is also performed on the activity recorded from each individual channel.  This is displayed as a power display of frequencies from 0 to 30 Hz over time.   The CSA analysis is done and displayed continuously.  This is reviewed for asymmetries in power between homologous areas of the scalp and for presence of changes in power which canbe seen when seizures occur.  Sections of suspected abnormalities on the CSA is then compared with the original EEG recording.      [employer] software was also utilized in the review of this study.  This software suite analyzes the EEG recording in multiple domains.  Coherence and rhythmicity  is computed to identify EEG sections which may contain organized seizures.  Each channel undergoes analysis to detect presence of spike and sharp waves which have special and morphological characteristic of epileptic activity.  The routine EEG recording is converted from spacial into frequency domain.  This is then displayed comparing homologous areas to identify areas of significant asymmetry.  Algorithm to identify non-cortically generated artifact is used to separate eye movement, EMG and other artifact from the EEG.        ELECTROENCEPHALOGRAM  INTERICTAL:  Background activity:   The background rhythm was characterized by theta-alpha (7-9 Hz) activity with a 10 Hz posterior dominant alpha rhythm at 30-70 microvolts.   Symmetry and continuity: the background was continuous and symmetric    Sleep:   Normal sleep transients including sleep spindles, K complexes, vertex waves and POSTS were seen.    Activation procedures:   Hyperventilation and photic stimulation were not performed     Abnormal activity:   No epileptiform discharges, periodic discharges, lateralized rhythmic delta activity or electrographic seizures were seen.      ICTAL:   None    CLINICAL DESCRIPTION OF EVENTS/SEIZURES:  n/a    FINAL SUMMARY:  Recording Times  Start on 5/8/17, 12:25  Stop on 5/9/17, 07:00    A total of 18 hours and 18 minutes of EEG/Video telemetry was recorded.    ELECTROENCEPHALOGRAM   Normal awake and asleep recording.   No seizures/typical events reported during this period of recording.     CLINICAL SEIZURE/EVENT   Classification: n/a    Lateralization: n/a   Localization: n/a    Sarai Tate MD, DYLAN(), [facility].  Neurology-Epilepsy.
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
## 2017-05-09 NOTE — SUBJECTIVE & OBJECTIVE
Interval History: Pt had no acute events overnight. This AM, pt is sitting upright in bed watching TV. Pt has no complaints at this time. Pt denies seizures since admit.    Review of Systems   Constitutional: Negative for chills, diaphoresis, fatigue and fever.   Respiratory: Negative for cough, chest tightness, shortness of breath and wheezing.    Cardiovascular: Negative for chest pain, palpitations and leg swelling.   Gastrointestinal: Negative for abdominal distention, abdominal pain, diarrhea, nausea and vomiting.   Musculoskeletal: Positive for myalgias. Negative for back pain, gait problem, neck pain and neck stiffness.   Neurological: Positive for seizures. Negative for dizziness, syncope, weakness, light-headedness and headaches.     Objective:     Vital Signs (Most Recent):  Temp: 97.9 °F (36.6 °C) (05/09/17 0825)  Pulse: 62 (05/09/17 0825)  Resp: 16 (05/09/17 0825)  BP: 137/63 (05/09/17 0825)  SpO2: 97 % (05/09/17 0825) Vital Signs (24h Range):  Temp:  [97.7 °F (36.5 °C)-98.1 °F (36.7 °C)] 97.9 °F (36.6 °C)  Pulse:  [] 62  Resp:  [14-19] 16  SpO2:  [95 %-97 %] 97 %  BP: (113-137)/(56-78) 137/63     Weight: 99.8 kg (220 lb)  Body mass index is 34.46 kg/(m^2).    Intake/Output Summary (Last 24 hours) at 05/09/17 1128  Last data filed at 05/09/17 0820   Gross per 24 hour   Intake              460 ml   Output                0 ml   Net              460 ml      Physical Exam   Constitutional: He is oriented to person, place, and time. He appears well-developed and well-nourished. No distress.   Cardiovascular: Normal rate, regular rhythm, normal heart sounds and intact distal pulses.    No murmur heard.  Abdominal: Soft. Bowel sounds are normal. He exhibits no distension. There is no tenderness.   Musculoskeletal: Normal range of motion.   Neurological: He is alert and oriented to person, place, and time. No cranial nerve deficit.   Skin: Skin is warm and dry. He is not diaphoretic. No erythema.    Psychiatric: He has a normal mood and affect. His behavior is normal.       Significant Labs: All pertinent labs within the past 24 hours have been reviewed.    Significant Imaging: I have reviewed all pertinent imaging results/findings within the past 24 hours.

## 2017-05-09 NOTE — PLAN OF CARE
Problem: Patient Care Overview  Goal: Plan of Care Review  POC reviewed w/ pt and family. No seizure events today. Tolerating diet well, denies n/v. Up ad mateo. Pt to have sleep dep tonight until 0700 and to be given meds in am. Vs wnl. No s/sx of distress noted. Will cont to monitor.

## 2017-05-09 NOTE — PLAN OF CARE
Problem: Patient Care Overview  Goal: Individualization & Mutuality  Outcome: Ongoing (interventions implemented as appropriate)  Plan of care reviewed with patient. Pt remain free of falls. Pt verbalized understanding.

## 2017-05-10 PROCEDURE — 95957 EEG DIGITAL ANALYSIS: CPT

## 2017-05-10 PROCEDURE — 99232 SBSQ HOSP IP/OBS MODERATE 35: CPT | Mod: ,,, | Performed by: PHYSICIAN ASSISTANT

## 2017-05-10 PROCEDURE — 25000003 PHARM REV CODE 250: Performed by: PHYSICIAN ASSISTANT

## 2017-05-10 PROCEDURE — 95951 HC EEG MONITORING/VIDEO RECORD: CPT

## 2017-05-10 PROCEDURE — 20600001 HC STEP DOWN PRIVATE ROOM

## 2017-05-10 RX ADMIN — DIPHENHYDRAMINE HYDROCHLORIDE 50 MG: 50 CAPSULE ORAL at 06:05

## 2017-05-10 RX ADMIN — SODIUM CHLORIDE, PRESERVATIVE FREE 3 ML: 5 INJECTION INTRAVENOUS at 08:05

## 2017-05-10 RX ADMIN — TRAMADOL HYDROCHLORIDE 100 MG: 50 TABLET, FILM COATED ORAL at 06:05

## 2017-05-10 RX ADMIN — SODIUM CHLORIDE, PRESERVATIVE FREE 3 ML: 5 INJECTION INTRAVENOUS at 06:05

## 2017-05-10 RX ADMIN — SODIUM CHLORIDE, PRESERVATIVE FREE 3 ML: 5 INJECTION INTRAVENOUS at 01:05

## 2017-05-10 NOTE — PROGRESS NOTES
"[facility] Hospital Medicine  Progress Note    Patient Name: Rigo Baez  MRN: [id number]  Patient Class: IP- Inpatient   Admission Date: 5/8/2017  Length of Stay: 2 days  Attending Physician: Alfredito Swift MD  Primary Care Provider: Primary Doctor [facility] Medicine Team: [facility] F Carmelita Muñoz PA-C    Subjective:     Principal Problem:Localization-related (focal) (partial) symptomatic epilepsy and epileptic syndromes with complex partial seizures, intractable, without status epilepticus    HPI:  27 year old male with PMHx of seizure disorder presenting for EEG monitoring to characterize convulsions. Pt denies any other past medical history as well as tobacco use, alcohol abuse, and illicit drug use. Pt reports episodes began at age 22 and have always occurred during sleep. Pt does not recall the events. Pt endorses post-ictal confusion, fatigue following episodes, and tongue biting. Pt denies urinary and bowel incontinence. Pt reports he has not taken his keppra in over a month because "it didn't work." Pt reports last seizure occurred last night during sleep. Today, pt c/o myalgias and tongue soreness 2/2 event last night. Pt denies chest pain, SOB, abdominal pain, N/V/D, fever, chills, and recent sick contacts.    Hospital Course:  Pt admitted for EEG monitoring to characterize seizures  5/9: No seizures since admit. Sleep deprivation tonight, benadryl 50 mg and tramadol 100 gm tomorrow morning per EMU.  5/10: No seizure activity, no changes.      Interval History: no acute events overnight; no seizure activity overnight    Review of Systems   Constitutional: Negative for chills, diaphoresis, fatigue and fever.   Respiratory: Negative for cough, chest tightness, shortness of breath and wheezing.    Cardiovascular: Negative for chest pain, palpitations and leg swelling.   Gastrointestinal: Negative for abdominal distention, abdominal pain, diarrhea, nausea and vomiting. "   Musculoskeletal: Positive for myalgias. Negative for back pain, gait problem, neck pain and neck stiffness.   Neurological: Positive for seizures. Negative for dizziness, syncope, weakness, light-headedness and headaches.     Objective:     Vital Signs (Most Recent):  Temp: 97.8 °F (36.6 °C) (05/10/17 0807)  Pulse: (!) 54 (05/10/17 1000)  Resp: 14 (05/10/17 0807)  BP: (!) 111/55 (05/10/17 0807)  SpO2: 98 % (05/10/17 0807) Vital Signs (24h Range):  Temp:  [97.7 °F (36.5 °C)-98.9 °F (37.2 °C)] 97.8 °F (36.6 °C)  Pulse:  [51-77] 54  Resp:  [14-18] 14  SpO2:  [98 %-100 %] 98 %  BP: (111-134)/(55-72) 111/55     Weight: 99.8 kg (220 lb)  Body mass index is 34.46 kg/(m^2).    Intake/Output Summary (Last 24 hours) at 05/10/17 1150  Last data filed at 05/09/17 1800   Gross per 24 hour   Intake              360 ml   Output                0 ml   Net              360 ml      Physical Exam   Constitutional: He is oriented to person, place, and time. He appears well-developed and well-nourished. No distress.   Cardiovascular: Normal rate, regular rhythm, normal heart sounds and intact distal pulses.    No murmur heard.  Abdominal: Soft. Bowel sounds are normal. He exhibits no distension. There is no tenderness.   Musculoskeletal: Normal range of motion.   Neurological: He is alert and oriented to person, place, and time. No cranial nerve deficit.   Skin: Skin is warm and dry. He is not diaphoretic. No erythema.   Psychiatric: He has a normal mood and affect. His behavior is normal.       Significant Labs: All pertinent labs within the past 24 hours have been reviewed.    Significant Imaging: I have reviewed all pertinent imaging results/findings within the past 24 hours.    Assessment/Plan:      * Localization-related (focal) (partial) symptomatic epilepsy and epileptic syndromes with complex partial seizures, intractable, without status epilepticus  - Continuous VEEG monitoring. EMU consulted  - Vimpat held on admit per EMU  recs  - Seizure precautions and activations per protcol  5/9: No seizures since admit. Sleep deprivation tonight, benadryl 50 mg and tramadol 100 gm tomorrow morning per EMU.  5/10: no events  - IV Valium PRN for GTC greater than 5 min - hospital medicine to call epilepsy on call before administering       VTE Risk Mitigation         Ordered     Low Risk of VTE  Once      05/08/17 1051          Carmelita Muñoz PA-C  Department of Hospital Medicine   [facility]

## 2017-05-10 NOTE — ASSESSMENT & PLAN NOTE
- Continuous VEEG monitoring. EMU consulted  - Vimpat held on admit per EMU recs  - Seizure precautions and activations per protcol  5/9: No seizures since admit. Sleep deprivation tonight, benadryl 50 mg and tramadol 100 gm tomorrow morning per EMU.  5/10: no events  - IV Valium PRN for GTC greater than 5 min - hospital medicine to call epilepsy on call before administering

## 2017-05-10 NOTE — CONSULTS
[facility]  Epilepsy Consult Note  Name: Rigo Baez  MRN: [id number]   CSN: [id number]      Date: 05/10/2017     Patient seen in consultation by Tanya Fisher PA-C for seizures.   SUBJECTIVE:   Interval History:  No acute events overnight. Patient completed sleep deprivation overnight.     History of Present Illness:  The patient is a 27 y.o. yo RHAAM   The patient was accompanied by his family member who provided some of the history.      Patient reports that he started having seizures at age 22. He reports that they occur out of sleep. He states that his girlfriend has told him that she makes a screeching sound followed by tonic clonic activity, reports tongue biting. He reports that his last seizure was yesterday. He reports that frequency varies form 1 a month to 2-3 a month. He reports that he stopped taking vimpat 3 weeks ago. He is on no other AEDs. Please see below clinic note for more details.       Epilepsy History (per Dr. Tate's clinic note)  Onset of seizures was at age 22 years - all seizures to date per patient and GF are nocturnal.     1st seizure: per patient he woke up one day in the ambulance - has no recollection of the event.   GF states that she heard a screech (typical - occurs with all subsequent sz) followed by generalized tonic, clonic activity with his eyes wide open and frothing at the mouth and unresponsive. EMS was called and he was taklen to [facility] - evaluated and sent home. Not started on AED  2nd seizure occurred ~ 5-6 months later.  Subsequently occurred q 4-6 months until x 2 years - now occurring q 2-3 per month.   Patient reports that he always bites his tongue and his muscles feel sore the next day after the seizure - that is how he know he had a seizure.     Last seizure: 09/16  Triggers for seizures: sleep deprivation - possibly  The longest seizure-free interval: 6 months until 2 years ago, then 1 month only  There is no history of status  epilepticus.   There is history of physical injury as a result of seizures: bruises  Postictal symptoms include: muscle soreness and tiredness    Seizure Triggers  Sleep Deprivation - None  Other medications - None  Psych/stress - None  Photic stimulation - None  Hyperventilation - None  Medical Problems - None  Menses - No  Sensory Stimulation (light, sound, etc) - None  Missed dose of meds - None    AED Treatments  Present regimen  Vimpat     Prior treatments      Not tried  acetazolamide (Diamox, AZM)  amantadine  carbamazepine (Tegretol, CBZ)  clobezam (Onfi or Frizium, CLB)  ethosuximide (Zarontin, ESM)  eslicarbazine (Aptiom, ESL)  felbamate (felbatol, FBM)  gabapentin (Neurontin, GPN)  lamotrigine (Lamictal, LTG)   levetiracetam (Keppra, LEV)  methsuximide (Celontin, MSM)  methyphenytoin (Mesantion, MHT)  oxcarbazepine (Trileptal OXC)  perampanel (Fycompa, FCP)   phenobarbital (Pb)  phenytoin (Dilantin, PHT)  pregabalin (Lyrica, PGB)  primidone (Mysoline, PRM)  retigabine (Potiga, RTG)  rufinamide (Banzel, RUF)  tiagabine (Gabatril,  TGB)  topiramate (Topamax, TPM)  viagabatrin, (Sabril, VGB)  vagal nerve stimulator (VNS)  valproic acid (Depakote, VPA)  zonisamide (Zonegran, ZNA)  Benzodiazepines  diazepam - rectal (Diastatl)  diazepam - oral (Valium, DZ)  clonazepam (Klonopin, CZP)  clorazepate (Tranxene, CLZ)  Ativan  Brain Stimulation  Vagal Nerve Stimulation-n/a  DBS- n/a    Compliance method  Memory - yes  Mom or Spouse - Yes  Pill Box - no  Rudy calendar - no  Turn over medication bottle - no  Phone alarm - no    Seizure Evaluation  EEG Routine -   EEG Ambulatory -   EEG\Video Monitoring -   MRI/MRA -   5/8/17  9 mm somewhat rounded region of susceptibility in the right putamen concerning for possible prior hemorrhage however no volume loss or signal abnormality on additional sequences in this region. Clinical correlation and correlation with prior EEG findings advised.    Otherwise unremarkable MRI  brain specifically without evidence for additional parenchymal signal abnormality or enhancing lesion.    Incidental probable proteinaceous mucus retention cyst right maxillary antra.    CT/CTA Scan -   PET Scan -   Neuropsychological evaluation -   DEXA Scan    Potential Epilepsy Risk Factors:   Pregnancy/Labor/Delivery - full term uncomplicated pregnancy labor and vaginal delivery  Febrile seizures - none  Head injury  - none  CNS infection - none     Stroke - none  Family Hx of Sz - none    Review of Systems:  Constitutional: no fever or chills  Eyes: no visual changes  ENT: no nasal congestion or sore throat  Respiratory: no cough or shortness of breath  Cardiovascular: no chest pain or palpitations  Neurological: negative for dizziness, headaches, seizures, tremors and weakness    PAST MEDICAL HISTORY:   Active Ambulatory Problems     Diagnosis Date Noted    No Active Ambulatory Problems     Resolved Ambulatory Problems     Diagnosis Date Noted    No Resolved Ambulatory Problems     No Additional Past Medical History        PAST SURGICAL HISTORY: History reviewed. No pertinent surgical history.     FAMILY HISTORY: History reviewed. No pertinent family history.      SOCIAL HISTORY:   Social History     Social History    Marital status: Single     Spouse name: N/A    Number of children: N/A    Years of education: N/A     Occupational History    Not on file.     Social History Main Topics    Smoking status: Never Smoker    Smokeless tobacco: Not on file    Alcohol use Not on file    Drug use: Not on file    Sexual activity: Not on file     Other Topics Concern    Not on file     Social History Narrative        SUBSTANCE USE:  Social History     Social History Main Topics    Smoking status: Never Smoker    Smokeless tobacco: Not on file    Alcohol use Not on file    Drug use: Not on file    Sexual activity: Not on file      Social History   Substance Use Topics    Smoking status: Never Smoker     Smokeless tobacco: Not on file    Alcohol use Not on file        ALLERGIES: Review of patient's allergies indicates no known allergies.       OBJECTIVE:     Vital Signs (Most Recent):  Temp: 97.8 °F (36.6 °C) (05/10/17 0807)  Pulse: (!) 52 (05/10/17 0807)  Resp: 14 (05/10/17 0807)  BP: (!) 111/55 (05/10/17 0807)  SpO2: 98 % (05/10/17 0807)    Physical Exam:  General: well developed, well nourished  HENT: Head:normocephalic, atraumatic. Ears:right ear normal, left ear normal. Nose: no discharge. Throat: lips, mucosa, and tongue normal; teeth and gums normal.  Eyes: conjunctivae/corneas clear. PERRL.   Lungs:  normal respiratory effort  Cardiovascular: Heart: regular rate and rhythm. Chest Wall: no tenderness. Extremities: no cyanosis or edema, or clubbing. Pulses: not examined.    Higher Cortical Function:    Patient is a well developed, pleasant, well groomed individual appearing their stated age  Oriented - intact to person, place and time and followed two step instruction correctly.   Memory - Intact   Fund of knowledge was appropriate.    R-L Orientation - Intact   Language - Speech was fluent without evidence for an aphasia.    Cranial Nerves II - XII:    EOMs were intact with normal smooth and no nystagmus.    PERRLA.  Visual fields were full to confrontation.    Motor - facial movement was symmetrical and normal.    Facial sensory - Light touch sensations were normal.    Hearing was normal to finger rub.  Palate moved well and was symmetrical with normal palatal and oral sensation.    Tongue movement was full. Normal power and bulk was found in the massiter and rotator muscles of the neck.  Motor: Power, bulk and tone were normal in all extremities.  Sensory: Light touch and position senses were normal in all extremities.    Coordination:       Rapid alternating movements and rapid finger tapping - normal.       Finger to nose - nl.    Gait:  Station, gait and tandem walking were done without difficulty and  Romberg was negative.  Tremor: resting, postural, intentional - none    Laboratory:  CBC:     Recent Labs  Lab 05/08/17  1133   WBC 11.14   RBC 5.31   HGB 15.6   HCT 45.1      MCV 85   MCH 29.4   MCHC 34.6     CMP:     Recent Labs  Lab 05/08/17  1133   GLU 75   CALCIUM 9.1   ALBUMIN 3.6   PROT 7.6      K 4.1   CO2 24      BUN 10   CREATININE 1.0   ALKPHOS 142*   ALT 22   AST 25   BILITOT 0.4       Diagnostic Results:  Labs: Reviewed  MRI: Reviewed    ASSESSMENT/PLAN:     IMPRESSION:  1. Seizures - focal with secondary generalization vs primary generalized epilepsy; EMU for definitive diagnosis and to assess any unidentified daytime seizures    vEEG (Reviewed with Dr. Tate):  5/8-5/9: no seizures, no discharges   5/9-5/10: no seizures, no discharges    DISPOSITION:    1. [place] VEEG  2. Hold Vimpat   3. Seizure precautions  4. Activation procedures per protocol - HV and PS today    5. IV Valium PRN for GTC greater than 5 min - hospital medicine to call epilepsy on call before administering      Discussed with Dr. Tate

## 2017-05-10 NOTE — PLAN OF CARE
Problem: Patient Care Overview  Goal: Plan of Care Review  Outcome: Ongoing (interventions implemented as appropriate)  POC reviewed with pt. Pt verbalized understanding. Pt remained free from falls, skin breakdown, and injury. Call light remained within reach and side rails up x2. Neuro checks and vital signs done every 4 hours. Refer to flowsheets for full assessment and VS info. Seizure precautions maintained. EEG monitoring in place. Pt with no seizure activity so far this shift. Will continue to monitor.

## 2017-05-10 NOTE — SUBJECTIVE & OBJECTIVE
Interval History: no acute events overnight; no seizure activity overnight    Review of Systems   Constitutional: Negative for chills, diaphoresis, fatigue and fever.   Respiratory: Negative for cough, chest tightness, shortness of breath and wheezing.    Cardiovascular: Negative for chest pain, palpitations and leg swelling.   Gastrointestinal: Negative for abdominal distention, abdominal pain, diarrhea, nausea and vomiting.   Musculoskeletal: Positive for myalgias. Negative for back pain, gait problem, neck pain and neck stiffness.   Neurological: Positive for seizures. Negative for dizziness, syncope, weakness, light-headedness and headaches.     Objective:     Vital Signs (Most Recent):  Temp: 97.8 °F (36.6 °C) (05/10/17 0807)  Pulse: (!) 54 (05/10/17 1000)  Resp: 14 (05/10/17 0807)  BP: (!) 111/55 (05/10/17 0807)  SpO2: 98 % (05/10/17 0807) Vital Signs (24h Range):  Temp:  [97.7 °F (36.5 °C)-98.9 °F (37.2 °C)] 97.8 °F (36.6 °C)  Pulse:  [51-77] 54  Resp:  [14-18] 14  SpO2:  [98 %-100 %] 98 %  BP: (111-134)/(55-72) 111/55     Weight: 99.8 kg (220 lb)  Body mass index is 34.46 kg/(m^2).    Intake/Output Summary (Last 24 hours) at 05/10/17 1150  Last data filed at 05/09/17 1800   Gross per 24 hour   Intake              360 ml   Output                0 ml   Net              360 ml      Physical Exam   Constitutional: He is oriented to person, place, and time. He appears well-developed and well-nourished. No distress.   Cardiovascular: Normal rate, regular rhythm, normal heart sounds and intact distal pulses.    No murmur heard.  Abdominal: Soft. Bowel sounds are normal. He exhibits no distension. There is no tenderness.   Musculoskeletal: Normal range of motion.   Neurological: He is alert and oriented to person, place, and time. No cranial nerve deficit.   Skin: Skin is warm and dry. He is not diaphoretic. No erythema.   Psychiatric: He has a normal mood and affect. His behavior is normal.       Significant Labs:  All pertinent labs within the past 24 hours have been reviewed.    Significant Imaging: I have reviewed all pertinent imaging results/findings within the past 24 hours.

## 2017-05-11 PROCEDURE — 20600001 HC STEP DOWN PRIVATE ROOM

## 2017-05-11 PROCEDURE — 95957 EEG DIGITAL ANALYSIS: CPT

## 2017-05-11 PROCEDURE — 99232 SBSQ HOSP IP/OBS MODERATE 35: CPT | Mod: ,,, | Performed by: PHYSICIAN ASSISTANT

## 2017-05-11 PROCEDURE — 25000003 PHARM REV CODE 250: Performed by: PHYSICIAN ASSISTANT

## 2017-05-11 PROCEDURE — 95951 HC EEG MONITORING/VIDEO RECORD: CPT

## 2017-05-11 RX ORDER — DIPHENHYDRAMINE HCL 50 MG
50 CAPSULE ORAL ONCE
Status: COMPLETED | OUTPATIENT
Start: 2017-05-12 | End: 2017-05-12

## 2017-05-11 RX ORDER — TRAMADOL HYDROCHLORIDE 50 MG/1
100 TABLET ORAL ONCE
Status: COMPLETED | OUTPATIENT
Start: 2017-05-12 | End: 2017-05-12

## 2017-05-11 RX ADMIN — SODIUM CHLORIDE, PRESERVATIVE FREE 3 ML: 5 INJECTION INTRAVENOUS at 09:05

## 2017-05-11 RX ADMIN — SODIUM CHLORIDE, PRESERVATIVE FREE 3 ML: 5 INJECTION INTRAVENOUS at 02:05

## 2017-05-11 RX ADMIN — SODIUM CHLORIDE, PRESERVATIVE FREE 3 ML: 5 INJECTION INTRAVENOUS at 05:05

## 2017-05-11 NOTE — CONSULTS
[facility]  Epilepsy Consult Note  Name: Rigo Baez  MRN: [id number]   CSN: [id number]      Date: 05/11/2017     Patient seen in consultation by Tanya Fisher PA-C for seizures.   SUBJECTIVE:   Interval History:  No acute events overnight.    History of Present Illness:  The patient is a 27 y.o. yo RHAAM   The patient was accompanied by his family member who provided some of the history.      Patient reports that he started having seizures at age 22. He reports that they occur out of sleep. He states that his girlfriend has told him that she makes a screeching sound followed by tonic clonic activity, reports tongue biting. He reports that his last seizure was yesterday. He reports that frequency varies form 1 a month to 2-3 a month. He reports that he stopped taking vimpat 3 weeks ago. He is on no other AEDs. Please see below clinic note for more details.       Epilepsy History (per Dr. Tate's clinic note)  Onset of seizures was at age 22 years - all seizures to date per patient and GF are nocturnal.     1st seizure: per patient he woke up one day in the ambulance - has no recollection of the event.   GF states that she heard a screech (typical - occurs with all subsequent sz) followed by generalized tonic, clonic activity with his eyes wide open and frothing at the mouth and unresponsive. EMS was called and he was taklen to [facility] - evaluated and sent home. Not started on AED  2nd seizure occurred ~ 5-6 months later.  Subsequently occurred q 4-6 months until x 2 years - now occurring q 2-3 per month.   Patient reports that he always bites his tongue and his muscles feel sore the next day after the seizure - that is how he know he had a seizure.     Last seizure: 09/16  Triggers for seizures: sleep deprivation - possibly  The longest seizure-free interval: 6 months until 2 years ago, then 1 month only  There is no history of status epilepticus.   There is history of physical injury as  a result of seizures: bruises  Postictal symptoms include: muscle soreness and tiredness    Seizure Triggers  Sleep Deprivation - None  Other medications - None  Psych/stress - None  Photic stimulation - None  Hyperventilation - None  Medical Problems - None  Menses - No  Sensory Stimulation (light, sound, etc) - None  Missed dose of meds - None    AED Treatments  Present regimen  Vimpat     Prior treatments      Not tried  acetazolamide (Diamox, AZM)  amantadine  carbamazepine (Tegretol, CBZ)  clobezam (Onfi or Frizium, CLB)  ethosuximide (Zarontin, ESM)  eslicarbazine (Aptiom, ESL)  felbamate (felbatol, FBM)  gabapentin (Neurontin, GPN)  lamotrigine (Lamictal, LTG)   levetiracetam (Keppra, LEV)  methsuximide (Celontin, MSM)  methyphenytoin (Mesantion, MHT)  oxcarbazepine (Trileptal OXC)  perampanel (Fycompa, FCP)   phenobarbital (Pb)  phenytoin (Dilantin, PHT)  pregabalin (Lyrica, PGB)  primidone (Mysoline, PRM)  retigabine (Potiga, RTG)  rufinamide (Banzel, RUF)  tiagabine (Gabatril,  TGB)  topiramate (Topamax, TPM)  viagabatrin, (Sabril, VGB)  vagal nerve stimulator (VNS)  valproic acid (Depakote, VPA)  zonisamide (Zonegran, ZNA)  Benzodiazepines  diazepam - rectal (Diastatl)  diazepam - oral (Valium, DZ)  clonazepam (Klonopin, CZP)  clorazepate (Tranxene, CLZ)  Ativan  Brain Stimulation  Vagal Nerve Stimulation-n/a  DBS- n/a    Compliance method  Memory - yes  Mom or Spouse - Yes  Pill Box - no  Rudy calendar - no  Turn over medication bottle - no  Phone alarm - no    Seizure Evaluation  EEG Routine -   EEG Ambulatory -   EEG\Video Monitoring -   MRI/MRA -   5/8/17  9 mm somewhat rounded region of susceptibility in the right putamen concerning for possible prior hemorrhage however no volume loss or signal abnormality on additional sequences in this region. Clinical correlation and correlation with prior EEG findings advised.    Otherwise unremarkable MRI brain specifically without evidence for additional  parenchymal signal abnormality or enhancing lesion.    Incidental probable proteinaceous mucus retention cyst right maxillary antra.    CT/CTA Scan -   PET Scan -   Neuropsychological evaluation -   DEXA Scan    Potential Epilepsy Risk Factors:   Pregnancy/Labor/Delivery - full term uncomplicated pregnancy labor and vaginal delivery  Febrile seizures - none  Head injury  - none  CNS infection - none     Stroke - none  Family Hx of Sz - none    Review of Systems:  Constitutional: no fever or chills  Eyes: no visual changes  ENT: no nasal congestion or sore throat  Respiratory: no cough or shortness of breath  Cardiovascular: no chest pain or palpitations  Neurological: negative for dizziness, headaches, seizures, tremors and weakness    PAST MEDICAL HISTORY:   Active Ambulatory Problems     Diagnosis Date Noted    No Active Ambulatory Problems     Resolved Ambulatory Problems     Diagnosis Date Noted    No Resolved Ambulatory Problems     No Additional Past Medical History        PAST SURGICAL HISTORY: History reviewed. No pertinent surgical history.     FAMILY HISTORY: History reviewed. No pertinent family history.      SOCIAL HISTORY:   Social History     Social History    Marital status: Single     Spouse name: N/A    Number of children: N/A    Years of education: N/A     Occupational History    Not on file.     Social History Main Topics    Smoking status: Never Smoker    Smokeless tobacco: Not on file    Alcohol use Not on file    Drug use: Not on file    Sexual activity: Not on file     Other Topics Concern    Not on file     Social History Narrative        SUBSTANCE USE:  Social History     Social History Main Topics    Smoking status: Never Smoker    Smokeless tobacco: Not on file    Alcohol use Not on file    Drug use: Not on file    Sexual activity: Not on file      Social History   Substance Use Topics    Smoking status: Never Smoker    Smokeless tobacco: Not on file    Alcohol use Not  on file        ALLERGIES: Review of patient's allergies indicates no known allergies.       OBJECTIVE:     Vital Signs (Most Recent):  Temp: 98.5 °F (36.9 °C) (05/11/17 0807)  Pulse: (!) 59 (05/11/17 0807)  Resp: 16 (05/11/17 0807)  BP: (!) 94/46 (05/11/17 0807)  SpO2: 97 % (05/11/17 0807)    Physical Exam:  General: well developed, well nourished  HENT: Head:normocephalic, atraumatic. Ears:right ear normal, left ear normal. Nose: no discharge. Throat: lips, mucosa, and tongue normal; teeth and gums normal.  Eyes: conjunctivae/corneas clear. PERRL.   Lungs:  normal respiratory effort  Cardiovascular: Heart: regular rate and rhythm. Chest Wall: no tenderness. Extremities: no cyanosis or edema, or clubbing. Pulses: not examined.    Higher Cortical Function:    Patient is a well developed, pleasant, well groomed individual appearing their stated age  Oriented - intact to person, place and time and followed two step instruction correctly.   Memory - Intact   Fund of knowledge was appropriate.    R-L Orientation - Intact   Language - Speech was fluent without evidence for an aphasia.    Cranial Nerves II - XII:    EOMs were intact with normal smooth and no nystagmus.    PERRLA.  Visual fields were full to confrontation.    Motor - facial movement was symmetrical and normal.    Facial sensory - Light touch sensations were normal.    Hearing was normal to finger rub.  Palate moved well and was symmetrical with normal palatal and oral sensation.    Tongue movement was full. Normal power and bulk was found in the massiter and rotator muscles of the neck.  Motor: Power, bulk and tone were normal in all extremities.  Sensory: Light touch and position senses were normal in all extremities.    Coordination:       Rapid alternating movements and rapid finger tapping - normal.       Finger to nose - nl.    Gait:  Station, gait and tandem walking were done without difficulty and Romberg was negative.  Tremor: resting, postural,  intentional - none    Laboratory:  CBC:     Recent Labs  Lab 05/08/17  1133   WBC 11.14   RBC 5.31   HGB 15.6   HCT 45.1      MCV 85   MCH 29.4   MCHC 34.6     CMP:     Recent Labs  Lab 05/08/17  1133   GLU 75   CALCIUM 9.1   ALBUMIN 3.6   PROT 7.6      K 4.1   CO2 24      BUN 10   CREATININE 1.0   ALKPHOS 142*   ALT 22   AST 25   BILITOT 0.4       Diagnostic Results:  Labs: Reviewed  MRI: Reviewed    ASSESSMENT/PLAN:     IMPRESSION:  1. Seizures - focal with secondary generalization vs primary generalized epilepsy; EMU for definitive diagnosis and to assess any unidentified daytime seizures    vEEG (Reviewed with Dr. Tate):  5/8-5/9: no seizures, no discharges   5/9-5/10: no seizures, no discharges  5/10-5/11: no seizures, no discharges    DISPOSITION:    1. Cotninue VEEG  2. Hold Vimpat   3. Seizure precautions  4. Activation procedures per protocol - sleep deprivation again tonight, benadryl 50 mg and tramadol 100 mg tomorrow morning    5. IV Valium PRN for GTC greater than 5 min - hospital medicine to call epilepsy on call before administering      Discussed with Dr. Tate

## 2017-05-11 NOTE — SUBJECTIVE & OBJECTIVE
Interval History: no acute events overnight, no seizure activity thus far    Review of Systems   Constitutional: Negative for chills, diaphoresis, fatigue and fever.   Respiratory: Negative for cough, chest tightness, shortness of breath and wheezing.    Cardiovascular: Negative for chest pain, palpitations and leg swelling.   Gastrointestinal: Negative for abdominal distention, abdominal pain, diarrhea, nausea and vomiting.   Musculoskeletal: Positive for myalgias. Negative for back pain, gait problem, neck pain and neck stiffness.   Neurological: Positive for seizures. Negative for dizziness, syncope, weakness, light-headedness and headaches.     Objective:     Vital Signs (Most Recent):  Temp: 98.5 °F (36.9 °C) (05/11/17 0807)  Pulse: 60 (05/11/17 1000)  Resp: 16 (05/11/17 0807)  BP: (!) 94/46 (05/11/17 0807)  SpO2: 97 % (05/11/17 0807) Vital Signs (24h Range):  Temp:  [97.1 °F (36.2 °C)-98.7 °F (37.1 °C)] 98.5 °F (36.9 °C)  Pulse:  [52-77] 60  Resp:  [16-20] 16  SpO2:  [97 %-100 %] 97 %  BP: ()/(46-66) 94/46     Weight: 99.8 kg (220 lb)  Body mass index is 34.46 kg/(m^2).    Intake/Output Summary (Last 24 hours) at 05/11/17 1131  Last data filed at 05/10/17 1650   Gross per 24 hour   Intake              600 ml   Output                0 ml   Net              600 ml      Physical Exam   Constitutional: He is oriented to person, place, and time. He appears well-developed and well-nourished. No distress.   Cardiovascular: Normal rate, regular rhythm, normal heart sounds and intact distal pulses.    No murmur heard.  Abdominal: Soft. Bowel sounds are normal. He exhibits no distension. There is no tenderness.   Musculoskeletal: Normal range of motion.   Neurological: He is alert and oriented to person, place, and time. No cranial nerve deficit.   Skin: Skin is warm and dry. He is not diaphoretic. No erythema.   Psychiatric: He has a normal mood and affect. His behavior is normal.       Significant Labs: All  pertinent labs within the past 24 hours have been reviewed.    Significant Imaging: I have reviewed all pertinent imaging results/findings within the past 24 hours.

## 2017-05-11 NOTE — PROGRESS NOTES
"[facility] Hospital Medicine  Progress Note    Patient Name: Rigo Baez  MRN: [id number]  Patient Class: IP- Inpatient   Admission Date: 5/8/2017  Length of Stay: 3 days  Attending Physician: Alferdito Swift MD  Primary Care Provider: Primary [facility] Medicine Team: [facility] Carmelita Muñoz PA-C    Subjective:     Principal Problem:Localization-related (focal) (partial) symptomatic epilepsy and epileptic syndromes with complex partial seizures, intractable, without status epilepticus    HPI:  27 year old male with PMHx of seizure disorder presenting for EEG monitoring to characterize convulsions. Pt denies any other past medical history as well as tobacco use, alcohol abuse, and illicit drug use. Pt reports episodes began at age 22 and have always occurred during sleep. Pt does not recall the events. Pt endorses post-ictal confusion, fatigue following episodes, and tongue biting. Pt denies urinary and bowel incontinence. Pt reports he has not taken his keppra in over a month because "it didn't work." Pt reports last seizure occurred last night during sleep. Today, pt c/o myalgias and tongue soreness 2/2 event last night. Pt denies chest pain, SOB, abdominal pain, N/V/D, fever, chills, and recent sick contacts.    Hospital Course:  Pt admitted for EEG monitoring to characterize seizures  5/9: No seizures since admit. Sleep deprivation tonight, benadryl 50 mg and tramadol 100 gm tomorrow morning per EMU.  5/10-5/11: No seizure activity, no changes.      Interval History: no acute events overnight, no seizure activity thus far    Review of Systems   Constitutional: Negative for chills, diaphoresis, fatigue and fever.   Respiratory: Negative for cough, chest tightness, shortness of breath and wheezing.    Cardiovascular: Negative for chest pain, palpitations and leg swelling.   Gastrointestinal: Negative for abdominal distention, abdominal pain, diarrhea, nausea and vomiting. "   Musculoskeletal: Positive for myalgias. Negative for back pain, gait problem, neck pain and neck stiffness.   Neurological: Positive for seizures. Negative for dizziness, syncope, weakness, light-headedness and headaches.     Objective:     Vital Signs (Most Recent):  Temp: 98.5 °F (36.9 °C) (05/11/17 0807)  Pulse: 60 (05/11/17 1000)  Resp: 16 (05/11/17 0807)  BP: (!) 94/46 (05/11/17 0807)  SpO2: 97 % (05/11/17 0807) Vital Signs (24h Range):  Temp:  [97.1 °F (36.2 °C)-98.7 °F (37.1 °C)] 98.5 °F (36.9 °C)  Pulse:  [52-77] 60  Resp:  [16-20] 16  SpO2:  [97 %-100 %] 97 %  BP: ()/(46-66) 94/46     Weight: 99.8 kg (220 lb)  Body mass index is 34.46 kg/(m^2).    Intake/Output Summary (Last 24 hours) at 05/11/17 1131  Last data filed at 05/10/17 1650   Gross per 24 hour   Intake              600 ml   Output                0 ml   Net              600 ml      Physical Exam   Constitutional: He is oriented to person, place, and time. He appears well-developed and well-nourished. No distress.   Cardiovascular: Normal rate, regular rhythm, normal heart sounds and intact distal pulses.    No murmur heard.  Abdominal: Soft. Bowel sounds are normal. He exhibits no distension. There is no tenderness.   Musculoskeletal: Normal range of motion.   Neurological: He is alert and oriented to person, place, and time. No cranial nerve deficit.   Skin: Skin is warm and dry. He is not diaphoretic. No erythema.   Psychiatric: He has a normal mood and affect. His behavior is normal.       Significant Labs: All pertinent labs within the past 24 hours have been reviewed.    Significant Imaging: I have reviewed all pertinent imaging results/findings within the past 24 hours.    Assessment/Plan:      * Localization-related (focal) (partial) symptomatic epilepsy and epileptic syndromes with complex partial seizures, intractable, without status epilepticus  - Continuous VEEG monitoring. EMU consulted  - Vimpat held on admit per EMU recs  -  Seizure precautions and activations per protcol  5/9: No seizures since admit. Sleep deprivation tonight, benadryl 50 mg and tramadol 100 gm tomorrow morning per EMU.  5/10-5/11: no events  - IV Valium PRN for GTC greater than 5 min - hospital medicine to call epilepsy on call before administering       VTE Risk Mitigation         Ordered     Low Risk of VTE  Once      05/08/17 1051          Carmelita Muñoz PA-C  Department of Hospital Medicine   [facility]

## 2017-05-11 NOTE — ASSESSMENT & PLAN NOTE
- Continuous VEEG monitoring. EMU consulted  - Vimpat held on admit per EMU recs  - Seizure precautions and activations per protcol  5/9: No seizures since admit. Sleep deprivation tonight, benadryl 50 mg and tramadol 100 gm tomorrow morning per EMU.  5/10-5/11: no events  - IV Valium PRN for GTC greater than 5 min - hospital medicine to call epilepsy on call before administering

## 2017-05-11 NOTE — PLAN OF CARE
Problem: Patient Care Overview  Goal: Plan of Care Review  Outcome: Ongoing (interventions implemented as appropriate)  POC reviewed with pt. Pt verbalized understanding. Pt remained free from falls, skin breakdown, and injury. Call light remained within reach and side rail sup x2. Neuro checks and vital signs done every 4 hours. Refer to flowsheets for full assessment and VS info. Seizure precautions maintained. Pt with no events this shift. Will continue to monitor.

## 2017-05-12 PROCEDURE — 20600001 HC STEP DOWN PRIVATE ROOM

## 2017-05-12 PROCEDURE — 99232 SBSQ HOSP IP/OBS MODERATE 35: CPT | Mod: ,,, | Performed by: PHYSICIAN ASSISTANT

## 2017-05-12 PROCEDURE — 25000003 PHARM REV CODE 250: Performed by: PHYSICIAN ASSISTANT

## 2017-05-12 PROCEDURE — 95951 PR EEG MONITORING/VIDEORECORD: CPT | Mod: 26,,, | Performed by: PSYCHIATRY & NEUROLOGY

## 2017-05-12 RX ORDER — TRAMADOL HYDROCHLORIDE 50 MG/1
100 TABLET ORAL ONCE
Status: COMPLETED | OUTPATIENT
Start: 2017-05-13 | End: 2017-05-13

## 2017-05-12 RX ORDER — DIPHENHYDRAMINE HCL 50 MG
50 CAPSULE ORAL ONCE
Status: COMPLETED | OUTPATIENT
Start: 2017-05-13 | End: 2017-05-13

## 2017-05-12 RX ADMIN — TRAMADOL HYDROCHLORIDE 100 MG: 50 TABLET, COATED ORAL at 06:05

## 2017-05-12 RX ADMIN — SODIUM CHLORIDE, PRESERVATIVE FREE 3 ML: 5 INJECTION INTRAVENOUS at 06:05

## 2017-05-12 RX ADMIN — SODIUM CHLORIDE, PRESERVATIVE FREE 3 ML: 5 INJECTION INTRAVENOUS at 09:05

## 2017-05-12 RX ADMIN — DIPHENHYDRAMINE HYDROCHLORIDE 50 MG: 50 CAPSULE ORAL at 06:05

## 2017-05-12 RX ADMIN — SODIUM CHLORIDE, PRESERVATIVE FREE 3 ML: 5 INJECTION INTRAVENOUS at 03:05

## 2017-05-12 NOTE — ASSESSMENT & PLAN NOTE
- Continuous VEEG monitoring. EMU consulted  - Vimpat held on admit per EMU recs  - Seizure precautions and activations per protcol  5/9: No seizures since admit. Sleep deprivation tonight, benadryl 50 mg and tramadol 100 gm tomorrow morning per EMU.  5/10-5/12: no events  - IV Valium PRN for GTC greater than 5 min - hospital medicine to call epilepsy on call before administering
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
## 2017-05-12 NOTE — PROGRESS NOTES
"[facility] Hospital Medicine  Progress Note    Patient Name: Rigo Baez  MRN: [id number]  Patient Class: IP- Inpatient   Admission Date: 5/8/2017  Length of Stay: 4 days  Attending Physician: Alfredito Swift MD  Primary Care Provider: [facility] Doctor [facility] Medicine Team: [facility] [place] [facility] MED F Carmelita Muñoz PA-C    Subjective:     Principal Problem:Localization-related (focal) (partial) symptomatic epilepsy and epileptic syndromes with complex partial seizures, intractable, without status epilepticus    HPI:  27 year old male with PMHx of seizure disorder presenting for EEG monitoring to characterize convulsions. Pt denies any other past medical history as well as tobacco use, alcohol abuse, and illicit drug use. Pt reports episodes began at age 22 and have always occurred during sleep. Pt does not recall the events. Pt endorses post-ictal confusion, fatigue following episodes, and tongue biting. Pt denies urinary and bowel incontinence. Pt reports he has not taken his keppra in over a month because "it didn't work." Pt reports last seizure occurred last night during sleep. Today, pt c/o myalgias and tongue soreness 2/2 event last night. Pt denies chest pain, SOB, abdominal pain, N/V/D, fever, chills, and recent sick contacts.    Hospital Course:  Pt admitted for EEG monitoring to characterize seizures  5/9: No seizures since admit. Sleep deprivation tonight, benadryl 50 mg and tramadol 100 gm tomorrow morning per EMU.  5/10-5/12: No seizure activity, no changes.      Interval History: no seizure activity thus far    Review of Systems   Constitutional: Negative for chills, diaphoresis, fatigue and fever.   Respiratory: Negative for cough, chest tightness, shortness of breath and wheezing.    Cardiovascular: Negative for chest pain, palpitations and leg swelling.   Gastrointestinal: Negative for abdominal distention, abdominal pain, diarrhea, nausea and vomiting.   Musculoskeletal: Positive for " myalgias. Negative for back pain, gait problem, neck pain and neck stiffness.   Neurological: Positive for seizures. Negative for dizziness, syncope, weakness, light-headedness and headaches.     Objective:     Vital Signs (Most Recent):  Temp: 98.3 °F (36.8 °C) (05/12/17 0400)  Pulse: 70 (05/12/17 1100)  Resp: 20 (05/12/17 0400)  BP: 129/69 (05/12/17 0400)  SpO2: 98 % (05/12/17 0400) Vital Signs (24h Range):  Temp:  [97.4 °F (36.3 °C)-98.4 °F (36.9 °C)] 98.3 °F (36.8 °C)  Pulse:  [61-92] 70  Resp:  [20] 20  SpO2:  [98 %-99 %] 98 %  BP: (128-137)/(58-73) 129/69     Weight: 99.8 kg (220 lb)  Body mass index is 34.46 kg/(m^2).    Intake/Output Summary (Last 24 hours) at 05/12/17 1235  Last data filed at 05/11/17 1700   Gross per 24 hour   Intake              700 ml   Output                0 ml   Net              700 ml      Physical Exam   Constitutional: He is oriented to person, place, and time. He appears well-developed and well-nourished. No distress.   Cardiovascular: Normal rate, regular rhythm, normal heart sounds and intact distal pulses.    No murmur heard.  Abdominal: Soft. Bowel sounds are normal. He exhibits no distension. There is no tenderness.   Musculoskeletal: Normal range of motion.   Neurological: He is alert and oriented to person, place, and time. No cranial nerve deficit.   Skin: Skin is warm and dry. He is not diaphoretic. No erythema.   Psychiatric: He has a normal mood and affect. His behavior is normal.       Significant Labs: All pertinent labs within the past 24 hours have been reviewed.    Significant Imaging: I have reviewed all pertinent imaging results/findings within the past 24 hours.    Assessment/Plan:      * Localization-related (focal) (partial) symptomatic epilepsy and epileptic syndromes with complex partial seizures, intractable, without status epilepticus  - Continuous VEEG monitoring. EMU consulted  - Vimpat held on admit per EMU recs  - Seizure precautions and activations  per protcol  5/9: No seizures since admit. Sleep deprivation tonight, benadryl 50 mg and tramadol 100 gm tomorrow morning per EMU.  5/10-5/12: no events  - IV Valium PRN for GTC greater than 5 min - hospital medicine to call epilepsy on call before administering       VTE Risk Mitigation         Ordered     Low Risk of VTE  Once      05/08/17 1051          Carmelita Muñoz PA-C  Department of Hospital Medicine   [facility]

## 2017-05-12 NOTE — SUBJECTIVE & OBJECTIVE
Interval History: no seizure activity thus far    Review of Systems   Constitutional: Negative for chills, diaphoresis, fatigue and fever.   Respiratory: Negative for cough, chest tightness, shortness of breath and wheezing.    Cardiovascular: Negative for chest pain, palpitations and leg swelling.   Gastrointestinal: Negative for abdominal distention, abdominal pain, diarrhea, nausea and vomiting.   Musculoskeletal: Positive for myalgias. Negative for back pain, gait problem, neck pain and neck stiffness.   Neurological: Positive for seizures. Negative for dizziness, syncope, weakness, light-headedness and headaches.     Objective:     Vital Signs (Most Recent):  Temp: 98.3 °F (36.8 °C) (05/12/17 0400)  Pulse: 70 (05/12/17 1100)  Resp: 20 (05/12/17 0400)  BP: 129/69 (05/12/17 0400)  SpO2: 98 % (05/12/17 0400) Vital Signs (24h Range):  Temp:  [97.4 °F (36.3 °C)-98.4 °F (36.9 °C)] 98.3 °F (36.8 °C)  Pulse:  [61-92] 70  Resp:  [20] 20  SpO2:  [98 %-99 %] 98 %  BP: (128-137)/(58-73) 129/69     Weight: 99.8 kg (220 lb)  Body mass index is 34.46 kg/(m^2).    Intake/Output Summary (Last 24 hours) at 05/12/17 1235  Last data filed at 05/11/17 1700   Gross per 24 hour   Intake              700 ml   Output                0 ml   Net              700 ml      Physical Exam   Constitutional: He is oriented to person, place, and time. He appears well-developed and well-nourished. No distress.   Cardiovascular: Normal rate, regular rhythm, normal heart sounds and intact distal pulses.    No murmur heard.  Abdominal: Soft. Bowel sounds are normal. He exhibits no distension. There is no tenderness.   Musculoskeletal: Normal range of motion.   Neurological: He is alert and oriented to person, place, and time. No cranial nerve deficit.   Skin: Skin is warm and dry. He is not diaphoretic. No erythema.   Psychiatric: He has a normal mood and affect. His behavior is normal.       Significant Labs: All pertinent labs within the past 24  hours have been reviewed.    Significant Imaging: I have reviewed all pertinent imaging results/findings within the past 24 hours.

## 2017-05-12 NOTE — PLAN OF CARE
Problem: Patient Care Overview  Goal: Plan of Care Review  Outcome: Ongoing (interventions implemented as appropriate)  POC reviewed with pt and family; all able to verbalize acceptance and understanding.  Pt's VS stable.  AAOx4.  Questions answered/encouraged; reassurance provided.  Seizure precautions maintained:  Padded side rails up x2, O2 and suction at bedside.  Call light in reach, side rails up x2, nonskid socks on, bed alarm set, bed in lowest position with wheels locked.  Remains free from falls, skin breakdown, and injury.  Denies pain.  No acute events overnight.  Able to ambulate independently in room.  Sleep deprived until 0700; pt been awake all throughout shift; compliant with sleep deprivation.  Will continue to monitor.

## 2017-05-13 PROCEDURE — 20600001 HC STEP DOWN PRIVATE ROOM

## 2017-05-13 PROCEDURE — 99232 SBSQ HOSP IP/OBS MODERATE 35: CPT | Mod: ,,, | Performed by: PHYSICIAN ASSISTANT

## 2017-05-13 PROCEDURE — 95957 EEG DIGITAL ANALYSIS: CPT

## 2017-05-13 PROCEDURE — 95951 HC EEG MONITORING/VIDEO RECORD: CPT

## 2017-05-13 PROCEDURE — 99233 SBSQ HOSP IP/OBS HIGH 50: CPT | Mod: ,,, | Performed by: PSYCHIATRY & NEUROLOGY

## 2017-05-13 PROCEDURE — 95951 PR EEG MONITORING/VIDEORECORD: CPT | Mod: 26,,, | Performed by: PSYCHIATRY & NEUROLOGY

## 2017-05-13 PROCEDURE — 25000003 PHARM REV CODE 250: Performed by: PHYSICIAN ASSISTANT

## 2017-05-13 RX ADMIN — TRAMADOL HYDROCHLORIDE 100 MG: 50 TABLET, COATED ORAL at 06:05

## 2017-05-13 RX ADMIN — SODIUM CHLORIDE, PRESERVATIVE FREE 3 ML: 5 INJECTION INTRAVENOUS at 06:05

## 2017-05-13 RX ADMIN — SODIUM CHLORIDE, PRESERVATIVE FREE 3 ML: 5 INJECTION INTRAVENOUS at 03:05

## 2017-05-13 RX ADMIN — DIPHENHYDRAMINE HYDROCHLORIDE 50 MG: 50 CAPSULE ORAL at 06:05

## 2017-05-13 NOTE — SUBJECTIVE & OBJECTIVE
Interval History: no acute events overnight; no seizure activity thus far    Review of Systems   Constitutional: Negative for chills, diaphoresis, fatigue and fever.   Respiratory: Negative for cough, chest tightness, shortness of breath and wheezing.    Cardiovascular: Negative for chest pain, palpitations and leg swelling.   Gastrointestinal: Negative for abdominal distention, abdominal pain, diarrhea, nausea and vomiting.   Musculoskeletal: Positive for myalgias. Negative for back pain, gait problem, neck pain and neck stiffness.   Neurological: Positive for seizures. Negative for dizziness, syncope, weakness, light-headedness and headaches.     Objective:     Vital Signs (Most Recent):  Temp: 97.4 °F (36.3 °C) (05/13/17 0815)  Pulse: 65 (05/13/17 1100)  Resp: 14 (05/13/17 0815)  BP: (!) 102/57 (05/13/17 0815)  SpO2: 95 % (05/13/17 0815) Vital Signs (24h Range):  Temp:  [97.4 °F (36.3 °C)-98.5 °F (36.9 °C)] 97.4 °F (36.3 °C)  Pulse:  [55-71] 65  Resp:  [14-18] 14  SpO2:  [95 %-99 %] 95 %  BP: (102-130)/(51-70) 102/57     Weight: 99.8 kg (220 lb)  Body mass index is 34.46 kg/(m^2).  No intake or output data in the 24 hours ending 05/13/17 1228   Physical Exam   Constitutional: He is oriented to person, place, and time. He appears well-developed and well-nourished. No distress.   Cardiovascular: Normal rate, regular rhythm, normal heart sounds and intact distal pulses.    No murmur heard.  Abdominal: Soft. Bowel sounds are normal. He exhibits no distension. There is no tenderness.   Musculoskeletal: Normal range of motion.   Neurological: He is alert and oriented to person, place, and time. No cranial nerve deficit.   Skin: Skin is warm and dry. He is not diaphoretic. No erythema.   Psychiatric: He has a normal mood and affect. His behavior is normal.       Significant Labs: All pertinent labs within the past 24 hours have been reviewed.    Significant Imaging: I have reviewed all pertinent imaging results/findings  within the past 24 hours.

## 2017-05-13 NOTE — PLAN OF CARE
Problem: Patient Care Overview  Goal: Plan of Care Review  Plan of care reviewed with patient.  Verbalized understanding.  Patient is alert and oriented time 4.  No event of seizure noticed.  VSS stable resting comfortably in bed.  Will continue to monitor.

## 2017-05-13 NOTE — PLAN OF CARE
Problem: Patient Care Overview  Goal: Plan of Care Review  Outcome: Ongoing (interventions implemented as appropriate)  POC reviewed with pt; able to verbalize acceptance and understanding.  VS stable.  AAOx4.  Questions answered/encouraged; reassurance provided.  Call light in reach, side rails up x2, nonskid socks on, bed alarm set, bed in lowest position with wheels locked.  Seizure precautions maintained:  Padded side rails up x2, O2 and suction at bedside.  Remains free from falls, skin breakdown, and injury.  No acute events overnight.  Denies pain.  Sleep deprived until 0700 again.  Pt compliant with sleep deprivation but is extremely tired tonight.  Will continue to monitor.

## 2017-05-13 NOTE — PROGRESS NOTES
"[facility] Hospital Medicine  Progress Note    Patient Name: Rigo Baez  MRN: [id number]  Patient Class: IP- Inpatient   Admission Date: 5/8/2017  Length of Stay: 5 days  Attending Physician: Alfredito Swift MD  Primary Care Provider: [facility] Medicine Team: [facility] MED F Carmelita Muñoz PA-C    Subjective:     Principal Problem:Localization-related (focal) (partial) symptomatic epilepsy and epileptic syndromes with complex partial seizures, intractable, without status epilepticus    HPI:  27 year old male with PMHx of seizure disorder presenting for EEG monitoring to characterize convulsions. Pt denies any other past medical history as well as tobacco use, alcohol abuse, and illicit drug use. Pt reports episodes began at age 22 and have always occurred during sleep. Pt does not recall the events. Pt endorses post-ictal confusion, fatigue following episodes, and tongue biting. Pt denies urinary and bowel incontinence. Pt reports he has not taken his keppra in over a month because "it didn't work." Pt reports last seizure occurred last night during sleep. Today, pt c/o myalgias and tongue soreness 2/2 event last night. Pt denies chest pain, SOB, abdominal pain, N/V/D, fever, chills, and recent sick contacts.    Hospital Course:  Pt admitted for EEG monitoring to characterize seizures  5/9: No seizures since admit. Sleep deprivation tonight, benadryl 50 mg and tramadol 100 gm tomorrow morning per EMU.  5/10-5/13: No seizure activity, no changes.      Interval History: no acute events overnight; no seizure activity thus far    Review of Systems   Constitutional: Negative for chills, diaphoresis, fatigue and fever.   Respiratory: Negative for cough, chest tightness, shortness of breath and wheezing.    Cardiovascular: Negative for chest pain, palpitations and leg swelling.   Gastrointestinal: Negative for abdominal distention, abdominal pain, diarrhea, nausea and vomiting. "   Musculoskeletal: Positive for myalgias. Negative for back pain, gait problem, neck pain and neck stiffness.   Neurological: Positive for seizures. Negative for dizziness, syncope, weakness, light-headedness and headaches.     Objective:     Vital Signs (Most Recent):  Temp: 97.4 °F (36.3 °C) (05/13/17 0815)  Pulse: 65 (05/13/17 1100)  Resp: 14 (05/13/17 0815)  BP: (!) 102/57 (05/13/17 0815)  SpO2: 95 % (05/13/17 0815) Vital Signs (24h Range):  Temp:  [97.4 °F (36.3 °C)-98.5 °F (36.9 °C)] 97.4 °F (36.3 °C)  Pulse:  [55-71] 65  Resp:  [14-18] 14  SpO2:  [95 %-99 %] 95 %  BP: (102-130)/(51-70) 102/57     Weight: 99.8 kg (220 lb)  Body mass index is 34.46 kg/(m^2).  No intake or output data in the 24 hours ending 05/13/17 1228   Physical Exam   Constitutional: He is oriented to person, place, and time. He appears well-developed and well-nourished. No distress.   Cardiovascular: Normal rate, regular rhythm, normal heart sounds and intact distal pulses.    No murmur heard.  Abdominal: Soft. Bowel sounds are normal. He exhibits no distension. There is no tenderness.   Musculoskeletal: Normal range of motion.   Neurological: He is alert and oriented to person, place, and time. No cranial nerve deficit.   Skin: Skin is warm and dry. He is not diaphoretic. No erythema.   Psychiatric: He has a normal mood and affect. His behavior is normal.       Significant Labs: All pertinent labs within the past 24 hours have been reviewed.    Significant Imaging: I have reviewed all pertinent imaging results/findings within the past 24 hours.    Assessment/Plan:      * Localization-related (focal) (partial) symptomatic epilepsy and epileptic syndromes with complex partial seizures, intractable, without status epilepticus  - Continuous VEEG monitoring. EMU consulted  - Vimpat held on admit per EMU recs  - Seizure precautions and activations per protcol  5/9: No seizures since admit. Sleep deprivation tonight, benadryl 50 mg and tramadol  100 gm tomorrow morning per EMU.  5/10-5/12: no events  - IV Valium PRN for GTC greater than 5 min - hospital medicine to call epilepsy on call before administering       VTE Risk Mitigation         Ordered     Low Risk of VTE  Once      05/08/17 1051          Carmelita Muñoz PA-C  Department of Hospital Medicine   [facility]

## 2017-05-13 NOTE — CONSULTS
[facility]  Epilepsy Consult Note  Name: Rigo Baez  MRN: [id number]   CSN: [id number]      Date: 05/13/2017       SUBJECTIVE:   Interval History:  No acute events overnight.    History of Present Illness:  The patient is a 27 y.o. yo RHAAM   The patient was accompanied by his family member who provided some of the history.      Patient reports that he started having seizures at age 22. He reports that they occur out of sleep. He states that his girlfriend has told him that she makes a screeching sound followed by tonic clonic activity, reports tongue biting. He reports that his last seizure was yesterday. He reports that frequency varies form 1 a month to 2-3 a month. He reports that he stopped taking vimpat 3 weeks ago. He is on no other AEDs. Please see below clinic note for more details.       Epilepsy History (per Dr. Tate's clinic note)  Onset of seizures was at age 22 years - all seizures to date per patient and GF are nocturnal.     1st seizure: per patient he woke up one day in the ambulance - has no recollection of the event.   GF states that she heard a screech (typical - occurs with all subsequent sz) followed by generalized tonic, clonic activity with his eyes wide open and frothing at the mouth and unresponsive. EMS was called and he was taklen to [facility] - evaluated and sent home. Not started on AED  2nd seizure occurred ~ 5-6 months later.  Subsequently occurred q 4-6 months until x 2 years - now occurring q 2-3 per month.   Patient reports that he always bites his tongue and his muscles feel sore the next day after the seizure - that is how he know he had a seizure.     Last seizure: 09/16  Triggers for seizures: sleep deprivation - possibly  The longest seizure-free interval: 6 months until 2 years ago, then 1 month only  There is no history of status epilepticus.   There is history of physical injury as a result of seizures: bruises  Postictal symptoms include: muscle  soreness and tiredness    Seizure Triggers  Sleep Deprivation - None  Other medications - None  Psych/stress - None  Photic stimulation - None  Hyperventilation - None  Medical Problems - None  Menses - No  Sensory Stimulation (light, sound, etc) - None  Missed dose of meds - None    AED Treatments  Present regimen  Vimpat     Prior treatments      Not tried  acetazolamide (Diamox, AZM)  amantadine  carbamazepine (Tegretol, CBZ)  clobezam (Onfi or Frizium, CLB)  ethosuximide (Zarontin, ESM)  eslicarbazine (Aptiom, ESL)  felbamate (felbatol, FBM)  gabapentin (Neurontin, GPN)  lamotrigine (Lamictal, LTG)   levetiracetam (Keppra, LEV)  methsuximide (Celontin, MSM)  methyphenytoin (Mesantion, MHT)  oxcarbazepine (Trileptal OXC)  perampanel (Fycompa, FCP)   phenobarbital (Pb)  phenytoin (Dilantin, PHT)  pregabalin (Lyrica, PGB)  primidone (Mysoline, PRM)  retigabine (Potiga, RTG)  rufinamide (Banzel, RUF)  tiagabine (Gabatril,  TGB)  topiramate (Topamax, TPM)  viagabatrin, (Sabril, VGB)  vagal nerve stimulator (VNS)  valproic acid (Depakote, VPA)  zonisamide (Zonegran, ZNA)  Benzodiazepines  diazepam - rectal (Diastatl)  diazepam - oral (Valium, DZ)  clonazepam (Klonopin, CZP)  clorazepate (Tranxene, CLZ)  Ativan  Brain Stimulation  Vagal Nerve Stimulation-n/a  DBS- n/a    Compliance method  Memory - yes  Mom or Spouse - Yes  Pill Box - no  Rudy calendar - no  Turn over medication bottle - no  Phone alarm - no    Seizure Evaluation  EEG Routine -   EEG Ambulatory -   EEG\Video Monitoring -   MRI/MRA -   5/8/17  9 mm somewhat rounded region of susceptibility in the right putamen concerning for possible prior hemorrhage however no volume loss or signal abnormality on additional sequences in this region. Clinical correlation and correlation with prior EEG findings advised.    Otherwise unremarkable MRI brain specifically without evidence for additional parenchymal signal abnormality or enhancing lesion.    Incidental  probable proteinaceous mucus retention cyst right maxillary antra.    CT/CTA Scan -   PET Scan -   Neuropsychological evaluation -   DEXA Scan    Potential Epilepsy Risk Factors:   Pregnancy/Labor/Delivery - full term uncomplicated pregnancy labor and vaginal delivery  Febrile seizures - none  Head injury  - none  CNS infection - none     Stroke - none  Family Hx of Sz - none    Review of Systems:  Constitutional: no fever or chills  Eyes: no visual changes  ENT: no nasal congestion or sore throat  Respiratory: no cough or shortness of breath  Cardiovascular: no chest pain or palpitations  Neurological: negative for dizziness, headaches, seizures, tremors and weakness    PAST MEDICAL HISTORY:   Active Ambulatory Problems     Diagnosis Date Noted    No Active Ambulatory Problems     Resolved Ambulatory Problems     Diagnosis Date Noted    No Resolved Ambulatory Problems     No Additional Past Medical History        PAST SURGICAL HISTORY: History reviewed. No pertinent surgical history.     FAMILY HISTORY: History reviewed. No pertinent family history.      SOCIAL HISTORY:   Social History     Social History    Marital status: Single     Spouse name: N/A    Number of children: N/A    Years of education: N/A     Occupational History    Not on file.     Social History Main Topics    Smoking status: Never Smoker    Smokeless tobacco: Not on file    Alcohol use Not on file    Drug use: Not on file    Sexual activity: Not on file     Other Topics Concern    Not on file     Social History Narrative        SUBSTANCE USE:  Social History     Social History Main Topics    Smoking status: Never Smoker    Smokeless tobacco: Not on file    Alcohol use Not on file    Drug use: Not on file    Sexual activity: Not on file      Social History   Substance Use Topics    Smoking status: Never Smoker    Smokeless tobacco: Not on file    Alcohol use Not on file        ALLERGIES: Review of patient's allergies indicates  no known allergies.       OBJECTIVE:     Vital Signs (Most Recent):  Temp: 97.4 °F (36.3 °C) (05/13/17 0815)  Pulse: 65 (05/13/17 1100)  Resp: 14 (05/13/17 0815)  BP: (!) 102/57 (05/13/17 0815)  SpO2: 95 % (05/13/17 0815)    Physical Exam:  General: well developed, well nourished  HENT: Head:normocephalic, atraumatic. Ears:right ear normal, left ear normal. Nose: no discharge. Throat: lips, mucosa, and tongue normal; teeth and gums normal.  Eyes: conjunctivae/corneas clear. PERRL.   Lungs:  normal respiratory effort  Cardiovascular: Heart: regular rate and rhythm. Chest Wall: no tenderness. Extremities: no cyanosis or edema, or clubbing. Pulses: not examined.    Higher Cortical Function:    Patient is a well developed, pleasant, well groomed individual appearing their stated age  Oriented - intact to person, place and time and followed two step instruction correctly.   Memory - Intact   Fund of knowledge was appropriate.    R-L Orientation - Intact   Language - Speech was fluent without evidence for an aphasia.    Cranial Nerves II - XII:    EOMs were intact with normal smooth and no nystagmus.    PERRLA.  Visual fields were full to confrontation.    Motor - facial movement was symmetrical and normal.    Facial sensory - Light touch sensations were normal.    Hearing was normal to finger rub.  Palate moved well and was symmetrical with normal palatal and oral sensation.    Tongue movement was full. Normal power and bulk was found in the massiter and rotator muscles of the neck.  Motor: Power, bulk and tone were normal in all extremities.  Sensory: Light touch and position senses were normal in all extremities.    Coordination:       Rapid alternating movements and rapid finger tapping - normal.       Finger to nose - nl.    Gait:  Station, gait and tandem walking were done without difficulty and Romberg was negative.  Tremor: resting, postural, intentional - none    Laboratory:  CBC:     Recent Labs  Lab  05/08/17  1133   WBC 11.14   RBC 5.31   HGB 15.6   HCT 45.1      MCV 85   MCH 29.4   MCHC 34.6     CMP:     Recent Labs  Lab 05/08/17  1133   GLU 75   CALCIUM 9.1   ALBUMIN 3.6   PROT 7.6      K 4.1   CO2 24      BUN 10   CREATININE 1.0   ALKPHOS 142*   ALT 22   AST 25   BILITOT 0.4       Diagnostic Results:  Labs: Reviewed  MRI: Reviewed    ASSESSMENT/PLAN:     IMPRESSION:  1. Seizures - focal with secondary generalization vs primary generalized epilepsy; EMU for definitive diagnosis and to assess any unidentified daytime seizures    vEEG  5/8-5/9: no seizures, no discharges   5/9-5/10: no seizures, no discharges  5/10-5/11: no seizures, no discharges  5/11-5/12: no seizures, no discharges  5/12-5/13: no seizures, no epileptiform discharges,     DISPOSITION:    1. Cotn[place]ue VEEG  2. Hold Vimpat   3. Seizure precautions  4. Activation procedures per protocol   5. IV Valium PRN for GTC greater than 5 min - hospital medicine to call epilepsy on call before administering      Osito Parks MD (Z[place])  PGY5, [employer] clinical neurophysiology

## 2017-05-14 VITALS
HEIGHT: 67 IN | DIASTOLIC BLOOD PRESSURE: 76 MMHG | WEIGHT: 220 LBS | HEART RATE: 68 BPM | TEMPERATURE: 99 F | BODY MASS INDEX: 34.53 KG/M2 | RESPIRATION RATE: 18 BRPM | OXYGEN SATURATION: 98 % | SYSTOLIC BLOOD PRESSURE: 137 MMHG

## 2017-05-14 PROCEDURE — 99239 HOSP IP/OBS DSCHRG MGMT >30: CPT | Mod: ,,, | Performed by: PHYSICIAN ASSISTANT

## 2017-05-14 PROCEDURE — 95813 EEG EXTND MNTR 61-119 MIN: CPT | Mod: 26,,, | Performed by: PSYCHIATRY & NEUROLOGY

## 2017-05-14 PROCEDURE — 99233 SBSQ HOSP IP/OBS HIGH 50: CPT | Mod: ,,, | Performed by: PSYCHIATRY & NEUROLOGY

## 2017-05-14 RX ORDER — ZONISAMIDE 100 MG/1
300 CAPSULE ORAL NIGHTLY
Qty: 90 CAPSULE | Refills: 11
Start: 2017-05-14 | End: 2018-05-14

## 2017-05-14 NOTE — DISCHARGE INSTRUCTIONS
Patient is ready for discharge. Patient stable alert and oriented. IVs removed. No complaints of pain. Discussed discharge plan. Reviewed medications and side effects, appointments, and answered questions with patient. 1 prescription and work note given to patient.

## 2017-05-14 NOTE — CONSULTS
[facility]  Epilepsy Consult Note  Name: Rigo Baez  MRN: [id number]   CSN: [id number]      Date: 05/14/2017       SUBJECTIVE:   Interval History:  No acute events overnight.  Pt wants to be d/c home 2/2 psoriasis exacerbation on skin of [place] area    History of Present Illness:  The patient is a 27 y.o. yo RHAAM   The patient was accompanied by his family member who provided some of the history.      Patient reports that he started having seizures at age 22. He reports that they occur out of sleep. He states that his girlfriend has told him that she makes a screeching sound followed by tonic clonic activity, reports tongue biting. He reports that his last seizure was yesterday. He reports that frequency varies form 1 a month to 2-3 a month. He reports that he stopped taking vimpat 3 weeks ago. He is on no other AEDs. Please see below clinic note for more details.       Epilepsy History (per Dr. Tate's clinic note)  Onset of seizures was at age 22 years - all seizures to date per patient and GF are nocturnal.     1st seizure: per patient he woke up one day in the ambulance - has no recollection of the event.   GF states that she heard a screech (typical - occurs with all subsequent sz) followed by generalized tonic, clonic activity with his eyes wide open and frothing at the mouth and unresponsive. EMS was called and he was taklen to [facility] - evaluated and sent home. Not started on AED  2nd seizure occurred ~ 5-6 months later.  Subsequently occurred q 4-6 months until x 2 years - now occurring q 2-3 per month.   Patient reports that he always bites his tongue and his muscles feel sore the next day after the seizure - that is how he know he had a seizure.     Last seizure: 09/16  Triggers for seizures: sleep deprivation - possibly  The longest seizure-free interval: 6 months until 2 years ago, then 1 month only  There is no history of status epilepticus.   There is history of physical  injury as a result of seizures: bruises  Postictal symptoms include: muscle soreness and tiredness    Seizure Triggers  Sleep Deprivation - None  Other medications - None  Psych/stress - None  Photic stimulation - None  Hyperventilation - None  Medical Problems - None  Menses - No  Sensory Stimulation (light, sound, etc) - None  Missed dose of meds - None    AED Treatments  Present regimen  Vimpat     Prior treatments      Not tried  acetazolamide (Diamox, AZM)  amantadine  carbamazepine (Tegretol, CBZ)  clobezam (Onfi or Frizium, CLB)  ethosuximide (Zarontin, ESM)  eslicarbazine (Aptiom, ESL)  felbamate (felbatol, FBM)  gabapentin (Neurontin, GPN)  lamotrigine (Lamictal, LTG)   levetiracetam (Keppra, LEV)  methsuximide (Celontin, MSM)  methyphenytoin (Mesantion, MHT)  oxcarbazepine (Trileptal OXC)  perampanel (Fycompa, FCP)   phenobarbital (Pb)  phenytoin (Dilantin, PHT)  pregabalin (Lyrica, PGB)  primidone (Mysoline, PRM)  retigabine (Potiga, RTG)  rufinamide (Banzel, RUF)  tiagabine (Gabatril,  TGB)  topiramate (Topamax, TPM)  viagabatrin, (Sabril, VGB)  vagal nerve stimulator (VNS)  valproic acid (Depakote, VPA)  zonisamide (Zonegran, ZNA)  Benzodiazepines  diazepam - rectal (Diastatl)  diazepam - oral (Valium, DZ)  clonazepam (Klonopin, CZP)  clorazepate (Tranxene, CLZ)  Ativan  Brain Stimulation  Vagal Nerve Stimulation-n/a  DBS- n/a    Compliance method  Memory - yes  Mom or Spouse - Yes  Pill Box - no  Rudy calendar - no  Turn over medication bottle - no  Phone alarm - no    Seizure Evaluation  EEG Routine -   EEG Ambulatory -   EEG\Video Monitoring -   MRI/MRA -   5/8/17  9 mm somewhat rounded region of susceptibility in the right putamen concerning for possible prior hemorrhage however no volume loss or signal abnormality on additional sequences in this region. Clinical correlation and correlation with prior EEG findings advised.    Otherwise unremarkable MRI brain specifically without evidence for  additional parenchymal signal abnormality or enhancing lesion.    Incidental probable proteinaceous mucus retention cyst right maxillary antra.    CT/CTA Scan -   PET Scan -   Neuropsychological evaluation -   DEXA Scan    Potential Epilepsy Risk Factors:   Pregnancy/Labor/Delivery - full term uncomplicated pregnancy labor and vaginal delivery  Febrile seizures - none  Head injury  - none  CNS infection - none     Stroke - none  Family Hx of Sz - none    Review of Systems:  Constitutional: no fever or chills  Eyes: no visual changes  ENT: no nasal congestion or sore throat  Respiratory: no cough or shortness of breath  Cardiovascular: no chest pain or palpitations  Neurological: negative for dizziness, headaches, seizures, tremors and weakness    PAST MEDICAL HISTORY:   Active Ambulatory Problems     Diagnosis Date Noted    No Active Ambulatory Problems     Resolved Ambulatory Problems     Diagnosis Date Noted    No Resolved Ambulatory Problems     No Additional Past Medical History        PAST SURGICAL HISTORY: History reviewed. No pertinent surgical history.     FAMILY HISTORY: History reviewed. No pertinent family history.      SOCIAL HISTORY:   Social History     Social History    Marital status: Single     Spouse name: N/A    Number of children: N/A    Years of education: N/A     Occupational History    Not on file.     Social History Main Topics    Smoking status: Never Smoker    Smokeless tobacco: Not on file    Alcohol use Not on file    Drug use: Not on file    Sexual activity: Not on file     Other Topics Concern    Not on file     Social History Narrative        SUBSTANCE USE:  Social History     Social History Main Topics    Smoking status: Never Smoker    Smokeless tobacco: Not on file    Alcohol use Not on file    Drug use: Not on file    Sexual activity: Not on file      Social History   Substance Use Topics    Smoking status: Never Smoker    Smokeless tobacco: Not on file     Alcohol use Not on file        ALLERGIES: Review of patient's allergies indicates no known allergies.       OBJECTIVE:     Vital Signs (Most Recent):  Temp: 97.9 °F (36.6 °C) (05/14/17 0745)  Pulse: (!) 56 (05/14/17 0745)  Resp: 18 (05/14/17 0745)  BP: 118/66 (05/14/17 0745)  SpO2: 97 % (05/14/17 0745)    Physical Exam:  General: well developed, well nourished  HENT: Head:normocephalic, atraumatic. Ears:right ear normal, left ear normal. Nose: no discharge. Throat: lips, mucosa, and tongue normal; teeth and gums normal.  Eyes: conjunctivae/corneas clear. PERRL.   Lungs:  normal respiratory effort  Cardiovascular: Heart: regular rate and rhythm. Chest Wall: no tenderness. Extremities: no cyanosis or edema, or clubbing. Pulses: not examined.    Higher Cortical Function:    Patient is a well developed, pleasant, well groomed individual appearing their stated age  Oriented - intact to person, place and time and followed two step instruction correctly.   Memory - Intact   Fund of knowledge was appropriate.    R-L Orientation - Intact   Language - Speech was fluent without evidence for an aphasia.    Cranial Nerves II - XII:    EOMs were intact with normal smooth and no nystagmus.    PERRLA.  Visual fields were full to confrontation.    Motor - facial movement was symmetrical and normal.    Facial sensory - Light touch sensations were normal.    Hearing was normal to finger rub.  Palate moved well and was symmetrical with normal palatal and oral sensation.    Tongue movement was full. Normal power and bulk was found in the massiter and rotator muscles of the neck.  Motor: Power, bulk and tone were normal in all extremities.  Sensory: Light touch and position senses were normal in all extremities.    Coordination:       Rapid alternating movements and rapid finger tapping - normal.       Finger to nose - nl.    Gait:  Station, gait and tandem walking were done without difficulty and Romberg was negative.  Tremor: resting,  postural, intentional - none    Laboratory:  CBC:     Recent Labs  Lab 05/08/17  1133   WBC 11.14   RBC 5.31   HGB 15.6   HCT 45.1      MCV 85   MCH 29.4   MCHC 34.6     CMP:     Recent Labs  Lab 05/08/17  1133   GLU 75   CALCIUM 9.1   ALBUMIN 3.6   PROT 7.6      K 4.1   CO2 24      BUN 10   CREATININE 1.0   ALKPHOS 142*   ALT 22   AST 25   BILITOT 0.4       Diagnostic Results:  Labs: Reviewed  MRI: Reviewed    ASSESSMENT/PLAN:     IMPRESSION:  1. Seizures - focal with secondary generalization vs primary generalized epilepsy; EMU for definitive diagnosis and to assess any unidentified daytime seizures    vEEG   5/8-5/9: no seizures, no discharges   5/9-5/10: no seizures, no discharges  5/10-5/11: no seizures, no discharges  5/11-5/12: no seizures, no discharges  5/12-5/13: no seizures, no epileptiform discharges,   5/13-5/14: no seizures, no epileptiform discharges     DISPOSITION:    1. D/c home with f/u neurologist   2. Start Zonisamide 300 qhs      Osito (Girma Parks MD  PGY5, [employer] clinical neurophysiology

## 2017-05-14 NOTE — PLAN OF CARE
05/14/2017      Rigo Baez  [address]  [address]  [place] [address]          Hospital Medicine Dept.  [facility] [address] [place] [address] [phone] after hours  [phone] fax Rigo Baez has been hospitalized at the [facility] since 5/8/2017.  Please excuse the patient from duties.  Patient may return on 5/15/2017.  No restrictions.     Please contact me if you have any questions.            Carmelita Muñoz PA-C      __________________________  Carmelita Muñoz PA-C  05/14/2017

## 2017-05-14 NOTE — DISCHARGE SUMMARY
"[facility] Hospital Medicine  Discharge Summary      Patient Name: Rigo Baez  MRN: [id number]  Admission Date: 5/8/2017  Hospital Length of Stay: 6 days  Discharge Date and Time:  05/14/2017 12:55 PM  Attending Physician: Alfredito Swift MD   Discharging Provider: Carmelita Muñoz PA-C  Primary Care Provider: Primary Doctor [facility] Hospital Medicine Team: [facility]OS[facility] MED F Carmelita Muñoz PA-C    HPI:   27 year old male with PMHx of seizure disorder presenting for EEG monitoring to characterize convulsions. Pt denies any other past medical history as well as tobacco use, alcohol abuse, and illicit drug use. Pt reports episodes began at age 22 and have always occurred during sleep. Pt does not recall the events. Pt endorses post-ictal confusion, fatigue following episodes, and tongue biting. Pt denies urinary and bowel incontinence. Pt reports he has not taken his keppra in over a month because "it didn't work." Pt reports last seizure occurred last night during sleep. Today, pt c/o myalgias and tongue soreness 2/2 event last night. Pt denies chest pain, SOB, abdominal pain, N/V/D, fever, chills, and recent sick contacts.    * No surgery found *      Indwelling Lines/Drains at time of discharge:   Lines/Drains/Airways          No matching active lines, drains, or airways        Hospital Course:   Pt admitted for EEG monitoring to characterize seizures  5/9: No seizures since admit. Sleep deprivation tonight, benadryl 50 mg and tramadol 100 gm tomorrow morning per EMU.  5/10-5/14: No seizure activity, no changes.  Pt discharged on zonisamide 300 mg qhs.  Pt to follow up with neurologist.       Consults:   Consults         Status Ordering Provider     Inpatient consult to Neurology Epilepsy  Once     Provider:  (Not yet assigned)    Completed CHARLIE CALDWELL          Significant Diagnostic Studies: Labs: All labs within the past 24 hours have been reviewed    Pending Diagnostic Studies:     None    "     Final Active Diagnoses:    Diagnosis Date Noted POA    PRINCIPAL PROBLEM:  Localization-related (focal) (partial) symptomatic epilepsy and epileptic syndromes with complex partial seizures, intractable, without status epilepticus [G40.219] 05/08/2017 Yes      Problems Resolved During this Admission:    Diagnosis Date Noted Date Resolved POA      No new Assessment & Plan notes have been filed under this hospital service since the last note was generated.  Service: Hospital Medicine      Discharged Condition: good    Disposition:     Follow Up:  Follow-up Information     Follow up with [facility] - Neurology Epilepsy.    Specialty:  Neurology    [facility] information:    [facility] [address]  [facility] [address] [phone]    Additional information:    7th Floor - [facility] [place]        Patient Instructions:   No discharge procedures on file.  Medications:  Reconciled Home Medications:   Current Discharge Medication List      START taking these medications    Details   zonisamide (ZONEGRAN) 100 MG Cap Take 3 capsules (300 mg total) by mouth every evening.  Qty: 90 capsule, Refills: 11         STOP taking these medications       lacosamide (VIMPAT) 50 mg Tab Comments:   Reason for Stopping:             Time spent on the discharge of patient: >30 minutes    Carmelita Muñoz PA-C  Department of Hospital Medicine  [facility]

## 2017-05-14 NOTE — NURSING
Notified on call MD that pt needs paper prescription or prescription called into pharmacy for new seizure medication. Gave number of [facility] in [place] that is on file for pt pharmacy. Will notify patient of prescription location.

## 2017-05-15 ENCOUNTER — TELEPHONE (OUTPATIENT)
Dept: NEUROLOGY | Facility: CLINIC | Age: 27
End: 2017-05-15

## 2017-05-15 NOTE — TELEPHONE ENCOUNTER
letter was written for 3 day excuse from outdoor work r/t flare up of psoriasis. He could not shower or take his triamcinalone cream while in emu.

## 2017-05-15 NOTE — TELEPHONE ENCOUNTER
----- Message from Majo Harris sent at 5/15/2017  1:49 PM CDT -----  Contact: self @ [phone]  Pt is calling to request a note for his employer stating he needs an extended leave of absence due to his soriosis flare up since he was not able to shower or take his medication during the time of his eeg.  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  pls fax to [employer] at [phone].

## 2017-06-01 NOTE — PROCEDURES
EPILEPSY MONITORING UNIT  EEG/VIDEO TELEMETRY REPORT    Rigo Baez  [id number]  1990    DATE OF SERVICE: 5/9-10/2017    DATE OF ADMISSION: 5/8/2017  9:34 AM    METHODOLOGY      Electroencephalographic (EEG) is recorded with electrodes placed according to the International 10-20 placement system.  Thirty Two (32) channels of digital signal including the T1 and T2 electrodes are simultaneously recorded from the scalp and also including EKG, EMG  and/or eye movement monitors.  Recording band pass was 0.1 to 512 hz.  Digital video recording of the patient is simultaneously recorded with the EEG.  The patient is instructed report clinical symptoms which may occur during the recording session.  EEG and video recording is stored and archived in digital format. Activation procedures which include photic stimulation, hyperventilation and instructing patients to perform simple task are done in selected patients.         The EEG is displayed on a monitor screen and can be reformatted into different montages for evaluation.  The entire recoding is submitted for computer assisted analysis to detect spike and electrographic seizure activity.  The entire recording is visually reviewed and the times identified by computer analysis as being spikes or seizures are reviewed again.  Compresses spectral analysis (CSA) is also performed on the activity recorded from each individual channel.  This is displayed as a power display of frequencies from 0 to 30 Hz over time.   The CSA analysis is done and displayed continuously.  This is reviewed for asymmetries in power between homologous areas of the scalp and for presence of changes in power which canbe seen when seizures occur.  Sections of suspected abnormalities on the CSA is then compared with the original EEG recording.      [employer] software was also utilized in the review of this study.  This software suite analyzes the EEG recording in multiple domains.  Coherence and rhythmicity  is computed to identify EEG sections which may contain organized seizures.  Each channel undergoes analysis to detect presence of spike and sharp waves which have special and morphological characteristic of epileptic activity.  The routine EEG recording is converted from spacial into frequency domain.  This is then displayed comparing homologous areas to identify areas of significant asymmetry.  Algorithm to identify non-cortically generated artifact is used to separate eye movement, EMG and other artifact from the EEG.        ELECTROENCEPHALOGRAM  INTERICTAL:  Background activity:   The background rhythm was characterized by theta-alpha (7-9 Hz) activity with a 10 Hz posterior dominant alpha rhythm at 30-70 microvolts.   Symmetry and continuity: the background was continuous and symmetric    Sleep:   Normal sleep transients including sleep spindles, K complexes, vertex waves and POSTS were seen.    Activation procedures:   Hyperventilation and photic stimulation were not performed     Abnormal activity:   No epileptiform discharges, periodic discharges, lateralized rhythmic delta activity or electrographic seizures were seen.      ICTAL:   None    CLINICAL DESCRIPTION OF EVENTS/SEIZURES:  n/a    FINAL SUMMARY:  Recording Times  Start on 5/9/17, 07:00  Stop on 5/10/17, 07:00    A total of 24 hours of EEG/Video telemetry was recorded.    ELECTROENCEPHALOGRAM   Normal awake and asleep recording.   No seizures/typical events reported during this period of recording.     CLINICAL SEIZURE/EVENT   Classification: n/a    Lateralization: n/a   Localization: n/a    Sarai Tate MD, DYLAN(), [facility].  Neurology-Epilepsy.

## 2017-06-01 NOTE — PROCEDURES
EPILEPSY MONITORING UNIT  EEG/VIDEO TELEMETRY REPORT    Rigo Baez  [id number]  1990    DATE OF SERVICE: 5/12-13/2017    DATE OF ADMISSION: 5/8/2017  9:34 AM    METHODOLOGY      Electroencephalographic (EEG) is recorded with electrodes placed according to the International 10-20 placement system.  Thirty Two (32) channels of digital signal including the T1 and T2 electrodes are simultaneously recorded from the scalp and also including EKG, EMG  and/or eye movement monitors.  Recording band pass was 0.1 to 512 hz.  Digital video recording of the patient is simultaneously recorded with the EEG.  The patient is instructed report clinical symptoms which may occur during the recording session.  EEG and video recording is stored and archived in digital format. Activation procedures which include photic stimulation, hyperventilation and instructing patients to perform simple task are done in selected patients.         The EEG is displayed on a monitor screen and can be reformatted into different montages for evaluation.  The entire recoding is submitted for computer assisted analysis to detect spike and electrographic seizure activity.  The entire recording is visually reviewed and the times identified by computer analysis as being spikes or seizures are reviewed again.  Compresses spectral analysis (CSA) is also performed on the activity recorded from each individual channel.  This is displayed as a power display of frequencies from 0 to 30 Hz over time.   The CSA analysis is done and displayed continuously.  This is reviewed for asymmetries in power between homologous areas of the scalp and for presence of changes in power which canbe seen when seizures occur.  Sections of suspected abnormalities on the CSA is then compared with the original EEG recording.      [employer] software was also utilized in the review of this study.  This software suite analyzes the EEG recording in multiple domains.  Coherence and rhythmicity  is computed to identify EEG sections which may contain organized seizures.  Each channel undergoes analysis to detect presence of spike and sharp waves which have special and morphological characteristic of epileptic activity.  The routine EEG recording is converted from spacial into frequency domain.  This is then displayed comparing homologous areas to identify areas of significant asymmetry.  Algorithm to identify non-cortically generated artifact is used to separate eye movement, EMG and other artifact from the EEG.        ELECTROENCEPHALOGRAM  INTERICTAL:  Background activity:   The background rhythm was characterized by theta-alpha (7-9 Hz) activity with a 10 Hz posterior dominant alpha rhythm at 30-70 microvolts.   Symmetry and continuity: the background was continuous and symmetric    Sleep:   Normal sleep transients including sleep spindles, K complexes, vertex waves and POSTS were seen.    Activation procedures:   Hyperventilation and photic stimulation were performed and no abnormalities seen.    Abnormal activity:   No epileptiform discharges, periodic discharges, lateralized rhythmic delta activity or electrographic seizures were seen.    ICTAL:   None    CLINICAL DESCRIPTION OF EVENTS/SEIZURES:  n/a    FINAL SUMMARY:  Recording Times  Start on 5/12/17, 10:31  Stop on 5/13/17, 07:00    A total of 20 hours and 5 minutes of EEG/Video telemetry was recorded.    ELECTROENCEPHALOGRAM   Normal awake and asleep recording.   No seizures/typical events reported during this period of recording.     CLINICAL SEIZURE/EVENT   Classification: n/a    Lateralization: n/a   Localization: n/a    Sarai Tate MD, DYLAN(), [facility].  Neurology-Epilepsy.

## 2017-06-01 NOTE — PROCEDURES
EPILEPSY MONITORING UNIT  EEG/VIDEO TELEMETRY REPORT    Rigo Baez  [id number]  1990    DATE OF SERVICE: 5/11-12/2017    DATE OF ADMISSION: 5/8/2017  9:34 AM    METHODOLOGY      Electroencephalographic (EEG) is recorded with electrodes placed according to the International 10-20 placement system.  Thirty Two (32) channels of digital signal including the T1 and T2 electrodes are simultaneously recorded from the scalp and also including EKG, EMG  and/or eye movement monitors.  Recording band pass was 0.1 to 512 hz.  Digital video recording of the patient is simultaneously recorded with the EEG.  The patient is instructed report clinical symptoms which may occur during the recording session.  EEG and video recording is stored and archived in digital format. Activation procedures which include photic stimulation, hyperventilation and instructing patients to perform simple task are done in selected patients.         The EEG is displayed on a monitor screen and can be reformatted into different montages for evaluation.  The entire recoding is submitted for computer assisted analysis to detect spike and electrographic seizure activity.  The entire recording is visually reviewed and the times identified by computer analysis as being spikes or seizures are reviewed again.  Compresses spectral analysis (CSA) is also performed on the activity recorded from each individual channel.  This is displayed as a power display of frequencies from 0 to 30 Hz over time.   The CSA analysis is done and displayed continuously.  This is reviewed for asymmetries in power between homologous areas of the scalp and for presence of changes in power which canbe seen when seizures occur.  Sections of suspected abnormalities on the CSA is then compared with the original EEG recording.      [employer] software was also utilized in the review of this study.  This software suite analyzes the EEG recording in multiple domains.  Coherence and rhythmicity  is computed to identify EEG sections which may contain organized seizures.  Each channel undergoes analysis to detect presence of spike and sharp waves which have special and morphological characteristic of epileptic activity.  The routine EEG recording is converted from spacial into frequency domain.  This is then displayed comparing homologous areas to identify areas of significant asymmetry.  Algorithm to identify non-cortically generated artifact is used to separate eye movement, EMG and other artifact from the EEG.        ELECTROENCEPHALOGRAM  INTERICTAL:  Background activity:   The background rhythm was characterized by theta-alpha (7-9 Hz) activity with a 10 Hz posterior dominant alpha rhythm at 30-70 microvolts.   Symmetry and continuity: the background was continuous and symmetric    Sleep:   Normal sleep transients including sleep spindles, K complexes, vertex waves and POSTS were seen.    Activation procedures:   Hyperventilation and photic stimulation were not performed.    Abnormal activity:   No epileptiform discharges, periodic discharges, lateralized rhythmic delta activity or electrographic seizures were seen.    ICTAL:   None    CLINICAL DESCRIPTION OF EVENTS/SEIZURES:  n/a    FINAL SUMMARY:  Recording Times  Start on 5/11/17, 07:00  Stop on 5/12/17, 07:00    Break: 5/11/17, 18:20 - 5/12/17, 10:30 = 16:10    A total of 11 hours and 20 minutes of EEG/Video telemetry was recorded.    ELECTROENCEPHALOGRAM   Normal awake and asleep recording.   No seizures/typical events reported during this period of recording.     CLINICAL SEIZURE/EVENT   Classification: n/a    Lateralization: n/a   Localization: n/a    Sarai Tate MD, DYLAN(), [facility].  Neurology-Epilepsy.

## 2017-06-01 NOTE — PROCEDURES
EPILEPSY MONITORING UNIT  EEG/VIDEO TELEMETRY REPORT    Rigo Baez  [id number]  1990    DATE OF SERVICE: 5/10-11/2017    DATE OF ADMISSION: 5/8/2017  9:34 AM    METHODOLOGY      Electroencephalographic (EEG) is recorded with electrodes placed according to the International 10-20 placement system.  Thirty Two (32) channels of digital signal including the T1 and T2 electrodes are simultaneously recorded from the scalp and also including EKG, EMG  and/or eye movement monitors.  Recording band pass was 0.1 to 512 hz.  Digital video recording of the patient is simultaneously recorded with the EEG.  The patient is instructed report clinical symptoms which may occur during the recording session.  EEG and video recording is stored and archived in digital format. Activation procedures which include photic stimulation, hyperventilation and instructing patients to perform simple task are done in selected patients.         The EEG is displayed on a monitor screen and can be reformatted into different montages for evaluation.  The entire recoding is submitted for computer assisted analysis to detect spike and electrographic seizure activity.  The entire recording is visually reviewed and the times identified by computer analysis as being spikes or seizures are reviewed again.  Compresses spectral analysis (CSA) is also performed on the activity recorded from each individual channel.  This is displayed as a power display of frequencies from 0 to 30 Hz over time.   The CSA analysis is done and displayed continuously.  This is reviewed for asymmetries in power between homologous areas of the scalp and for presence of changes in power which canbe seen when seizures occur.  Sections of suspected abnormalities on the CSA is then compared with the original EEG recording.      [employer] software was also utilized in the review of this study.  This software suite analyzes the EEG recording in multiple domains.  Coherence and rhythmicity  is computed to identify EEG sections which may contain organized seizures.  Each channel undergoes analysis to detect presence of spike and sharp waves which have special and morphological characteristic of epileptic activity.  The routine EEG recording is converted from spacial into frequency domain.  This is then displayed comparing homologous areas to identify areas of significant asymmetry.  Algorithm to identify non-cortically generated artifact is used to separate eye movement, EMG and other artifact from the EEG.        ELECTROENCEPHALOGRAM  INTERICTAL:  Background activity:   The background rhythm was characterized by theta-alpha (7-9 Hz) activity with a 10 Hz posterior dominant alpha rhythm at 30-70 microvolts.   Symmetry and continuity: the background was continuous and symmetric    Sleep:   Normal sleep transients including sleep spindles, K complexes, vertex waves and POSTS were seen.    Activation procedures:   Hyperventilation and photic stimulation were performed and no abnormalities seen.    Abnormal activity:   No epileptiform discharges, periodic discharges, lateralized rhythmic delta activity or electrographic seizures were seen.    ICTAL:   None    CLINICAL DESCRIPTION OF EVENTS/SEIZURES:  n/a    FINAL SUMMARY:  Recording Times  Start on 5/10/17, 07:00  Stop on 5/11/17, 07:00    A total of 24 hours of EEG/Video telemetry was recorded.    ELECTROENCEPHALOGRAM   Normal awake and asleep recording.   No seizures/typical events reported during this period of recording.     CLINICAL SEIZURE/EVENT   Classification: n/a    Lateralization: n/a   Localization: n/a    Sarai Tate MD, MBA(), [facility].  Neurology-Epilepsy.

## 2017-07-04 ENCOUNTER — PATIENT MESSAGE (OUTPATIENT)
Dept: NEUROLOGY | Facility: CLINIC | Age: 27
End: 2017-07-04

## 2017-07-05 DIAGNOSIS — G40.219 PARTIAL EPILEPSY WITH IMPAIRMENT OF CONSCIOUSNESS, INTRACTABLE: Primary | ICD-10-CM

## 2020-12-04 ENCOUNTER — OCCUPATIONAL HEALTH (OUTPATIENT)
Dept: URGENT CARE | Facility: CLINIC | Age: 30
End: 2020-12-04

## 2020-12-04 DIAGNOSIS — Z02.83 ENCOUNTER FOR DRUG SCREENING: Primary | ICD-10-CM

## 2020-12-04 LAB
CTP QC/QA: YES
POC 10 PANEL DRUG SCREEN: NEGATIVE

## 2020-12-04 PROCEDURE — 80305 DRUG TEST PRSMV DIR OPT OBS: CPT | Mod: QW,S$GLB,, | Performed by: FAMILY MEDICINE

## 2020-12-04 PROCEDURE — 80305 POCT RAPID DRUG SCREEN 10 PANEL: ICD-10-PCS | Mod: QW,S$GLB,, | Performed by: FAMILY MEDICINE

## 2021-05-10 ENCOUNTER — PATIENT MESSAGE (OUTPATIENT)
Dept: RESEARCH | Facility: HOSPITAL | Age: 31
End: 2021-05-10

## 2023-07-19 ENCOUNTER — PATIENT MESSAGE (OUTPATIENT)
Dept: RESEARCH | Facility: HOSPITAL | Age: 33
End: 2023-07-19